# Patient Record
Sex: FEMALE | Race: WHITE
[De-identification: names, ages, dates, MRNs, and addresses within clinical notes are randomized per-mention and may not be internally consistent; named-entity substitution may affect disease eponyms.]

---

## 2017-01-19 ENCOUNTER — HOSPITAL ENCOUNTER (OUTPATIENT)
Dept: HOSPITAL 58 - LAB | Age: 37
Discharge: HOME | End: 2017-01-19
Attending: NURSE PRACTITIONER

## 2017-01-19 VITALS — BODY MASS INDEX: 28.8 KG/M2

## 2017-01-19 DIAGNOSIS — B18.2: Primary | ICD-10-CM

## 2017-01-19 LAB
ALBUMIN SERPL-MCNC: 3.8 G/DL (ref 3.4–5)
ALBUMIN/GLOB SERPL: 1.19 {RATIO}
ALP SERPL-CCNC: 76 U/L (ref 42–98)
ALT SERPL-CCNC: 15 U/L (ref 12–78)
ANION GAP SERPL CALC-SCNC: 14.1 MMOL/L
AST SERPL-CCNC: 16 U/L (ref 15–37)
BASOPHILS # BLD AUTO: 0.1 K/UL (ref 0–0.2)
BASOPHILS NFR BLD AUTO: 0.7 % (ref 0–3)
BILIRUB SERPL-MCNC: 0.3 MG/DL (ref 0–1.2)
BUN SERPL-MCNC: 10 MG/DL (ref 7–18)
BUN/CREAT SERPL: 11.23
CALCIUM SERPL-MCNC: 9.4 MG/DL (ref 8.2–10.2)
CHLORIDE SERPL-SCNC: 104 MMOL/L (ref 98–107)
CHOLEST SERPL-MCNC: 307 MG/DL (ref 0–200)
CHOLEST/HDLC SERPL: 7.9 {RATIO} (ref 4.5–5.5)
CO2 BLD-SCNC: 26 MMOL/L (ref 21–32)
CREAT SERPL-MCNC: 0.89 MG/DL (ref 0.6–1.3)
EOSINOPHIL # BLD AUTO: 0.3 K/UL (ref 0–0.7)
EOSINOPHIL NFR BLD AUTO: 3.5 % (ref 0–7)
GFR SERPLBLD BASED ON 1.73 SQ M-ARVRAT: 71 ML/MIN
GLOBULIN SER CALC-MCNC: 3.2 G/L
GLUCOSE SERPL-MCNC: 92 MG/DL (ref 70–110)
HCT VFR BLD AUTO: 40.9 % (ref 37–47)
HDLC SERPL-MCNC: 39 MG/DL (ref 35–80)
HGB BLD-MCNC: 13.6 G/DL (ref 12–16)
IMM GRANULOCYTES NFR BLD AUTO: 0.1 % (ref 0–5)
LYMPHOCYTES # SPEC AUTO: 2.6 K/UL (ref 0.6–3.4)
LYMPHOCYTES NFR BLD AUTO: 37.3 % (ref 10–50)
MCH RBC QN: 32.7 PG (ref 27–31)
MCHC RBC AUTO-ENTMCNC: 33.3 G/DL (ref 31.8–35.4)
MCV RBC: 98.3 FL (ref 81–99)
MONOCYTES # BLD AUTO: 0.5 K/UL (ref 0.4–2)
MONOCYTES NFR BLD AUTO: 7 % (ref 0–10)
NEUTROPHILS # BLD AUTO: 3.6 K/UL (ref 2–6.9)
NEUTROPHILS NFR BLD AUTO: 51.4 %
PLATELET # BLD AUTO: 290 10^3/UL (ref 140–440)
POTASSIUM SERPL-SCNC: 4.1 MMOL/L (ref 3.5–5.1)
PROT SERPL-MCNC: 7 G/DL (ref 6.4–8.2)
RBC # BLD AUTO: 4.16 10^6/UL (ref 4.2–5.4)
SODIUM SERPL-SCNC: 140 MMOL/L (ref 136–145)
TRIGL SERPL-MCNC: 116 MG/DL (ref 30–150)
VLDLC SERPL CALC-MCNC: 23 MG/DL (ref 2–30)
WBC # BLD AUTO: 7.05 K/UL (ref 4.6–10.2)

## 2017-01-19 PROCEDURE — 85025 COMPLETE CBC W/AUTO DIFF WBC: CPT

## 2017-01-19 PROCEDURE — 80061 LIPID PANEL: CPT

## 2017-01-19 PROCEDURE — 36415 COLL VENOUS BLD VENIPUNCTURE: CPT

## 2017-01-19 PROCEDURE — 80053 COMPREHEN METABOLIC PANEL: CPT

## 2017-04-13 ENCOUNTER — HOSPITAL ENCOUNTER (OUTPATIENT)
Dept: HOSPITAL 58 - RAD | Age: 37
Discharge: HOME | End: 2017-04-13
Attending: NURSE PRACTITIONER

## 2017-04-13 VITALS — BODY MASS INDEX: 28.8 KG/M2

## 2017-04-13 DIAGNOSIS — E65: Primary | ICD-10-CM

## 2017-04-13 PROCEDURE — 76882 US LMTD JT/FCL EVL NVASC XTR: CPT

## 2017-04-13 NOTE — US
EXAM:  Ultrasound exam 

  

HISTORY:  Localized adiposity 

  

COMPARISON:  None 

  

FINDINGS:  Ultrasound right axilla was performed in the region of clinical concern.  No mass  is leonid
ntified.  No focal sonographic abnormality identified. 

  

IMPRESSION:  No mass identified in the right axilla in the region of clinical concern.

## 2017-05-27 ENCOUNTER — HOSPITAL ENCOUNTER (EMERGENCY)
Dept: HOSPITAL 58 - ED | Age: 37
Discharge: HOME | End: 2017-05-27

## 2017-05-27 VITALS — SYSTOLIC BLOOD PRESSURE: 116 MMHG | DIASTOLIC BLOOD PRESSURE: 81 MMHG | TEMPERATURE: 98 F

## 2017-05-27 VITALS — BODY MASS INDEX: 28.1 KG/M2

## 2017-05-27 DIAGNOSIS — Z76.0: ICD-10-CM

## 2017-05-27 DIAGNOSIS — Z00.8: Primary | ICD-10-CM

## 2017-05-27 DIAGNOSIS — Z79.899: ICD-10-CM

## 2017-05-27 PROCEDURE — 99282 EMERGENCY DEPT VISIT SF MDM: CPT

## 2017-05-27 NOTE — ED.PDOC
Medical Screening Exam





- General Information


Time Seen by Physician*: 14:58


Mode of Arrival: Walk-In


Information Source: Patient





- History


Chief Complaint: Medication Refill


Stated Complaint: Patient medications are due at [facility], it is 

closed today needs medications for 3 days.  not suicidal or hocidal.


Severity: None





- Review Of Systems


Constitutional: None


CV: Reports: None


Respiratory: Reports: None


GI: Reports: None


: Reports: None


Musculoskeletal: Reports: None


Neuro: Reports: None





- Past Medical History


Past Medical History: Previously healthy (depression)





- Examination Findings


Visit Related to Pregnancy: No





- Medical Decision Making


Emergency Medical Condition: No





Physical Exam





- Physical Exam


Appearance: Well-appearing, No pain distress, Well-nourished


Eyes: JD, EOMI, Conjunctiva clear


ENT: Ears normal, Nose normal, Oropharynx normal


Respiratory: Airway patent, Breath sounds clear, Breath sounds equal, 

Respirations nonlabored


Cardiovascular: RRR, Pulses normal, No rub, No murmur


GI/: Soft, Nontender, No masses, Bowel sounds normal, No Organomegaly


Musculoskeletal: Normal strength, ROM intact, No edema, No calf tenderness


Skin: Warm, Dry, Normal color


Neurological: Sensation intact, Motor intact, Reflexes intact, Cranial nerves 

intact, Alert, Oriented


Psychiatric: Affect appropriate, Mood appropriate





Critical Care Note





- Critical Care Note


Total Time (mins): 0





Course





- Course


Vital Signs: 





 











  Temp Pulse Resp BP Pulse Ox


 


 05/27/17 14:42  98 F  96 H  20  116/81  95














Departure





- Departure


Time of Disposition: 15:03


Disposition: HOME SELF-CARE


Discharge Problem: 


 Medication refill





Instructions:  Medicine Refill (ED)


Condition: Stable


Pt referred to PMD for follow-up: No


Additional Instructions: 


patient is stable


all she needs is refills





Prescriptions: 


Escitalopram Oxalate [Lexapro] 20 mg PO DAILY #4 tablet


Quetiapine Fumarate [Seroquel] 200 mg PO DAILY #4 tablet


Allergies/Adverse Reactions: 


Allergies





No Known Allergies Allergy (Verified 11/21/16 13:45)


 








Home Medications: 


Ambulatory Orders





Diazepam [Valium] 10 mg PO TID 08/06/13 


Quetiapine Fumarate [Seroquel] 200 mg PO BEDTIME 08/06/13 


Escitalopram Oxalate [Lexapro] 20 mg PO DAILY 04/12/17 


Escitalopram Oxalate [Lexapro] 20 mg PO DAILY #4 tablet 05/27/17 


Quetiapine Fumarate [Seroquel] 200 mg PO DAILY #4 tablet 05/27/17 








Disposition Discussed With: Patient

## 2017-08-01 ENCOUNTER — HOSPITAL ENCOUNTER (EMERGENCY)
Dept: HOSPITAL 58 - ED | Age: 37
Discharge: HOME | End: 2017-08-01

## 2017-08-01 VITALS — DIASTOLIC BLOOD PRESSURE: 77 MMHG | SYSTOLIC BLOOD PRESSURE: 112 MMHG | TEMPERATURE: 98.8 F

## 2017-08-01 VITALS — BODY MASS INDEX: 27.3 KG/M2

## 2017-08-01 DIAGNOSIS — R10.9: Primary | ICD-10-CM

## 2017-08-01 DIAGNOSIS — F17.210: ICD-10-CM

## 2017-08-01 LAB
ADD URINE MICROSCOPIC: YES
ALBUMIN SERPL-MCNC: 3.6 G/DL (ref 3.4–5)
ALBUMIN/GLOB SERPL: 1.2 {RATIO}
ALP SERPL-CCNC: 73 U/L (ref 42–98)
ALT SERPL-CCNC: 18 U/L (ref 12–78)
AMYLASE SERPL-CCNC: 69 U/L (ref 25–115)
ANION GAP SERPL CALC-SCNC: 11.8 MMOL/L
AST SERPL-CCNC: 16 U/L (ref 15–37)
BASOPHILS # BLD AUTO: 0 K/UL (ref 0–0.2)
BASOPHILS NFR BLD AUTO: 0.6 % (ref 0–3)
BILIRUB SERPL-MCNC: 0.26 MG/DL (ref 0–1.2)
BUN SERPL-MCNC: 7 MG/DL (ref 7–18)
BUN/CREAT SERPL: 6.79
CALCIUM SERPL-MCNC: 8.7 MG/DL (ref 8.2–10.2)
CHLORIDE SERPL-SCNC: 105 MMOL/L (ref 98–107)
CO2 BLD-SCNC: 28 MMOL/L (ref 21–32)
COCAINE UR QL SCN: NEGATIVE
CREAT SERPL-MCNC: 1.03 MG/DL (ref 0.6–1.3)
EOSINOPHIL # BLD AUTO: 0.4 K/UL (ref 0–0.7)
EOSINOPHIL NFR BLD AUTO: 5.3 % (ref 0–7)
GFR SERPLBLD BASED ON 1.73 SQ M-ARVRAT: 60 ML/MIN
GLOBULIN SER CALC-MCNC: 3 G/L
GLUCOSE SERPL-MCNC: 81 MG/DL (ref 70–110)
HCT VFR BLD AUTO: 37.1 % (ref 37–47)
HGB BLD-MCNC: 12.8 G/DL (ref 12–16)
IMM GRANULOCYTES NFR BLD AUTO: 0.3 % (ref 0–5)
LIPASE SERPL-CCNC: 51 U/L (ref 8–78)
LYMPHOCYTES # SPEC AUTO: 3.3 K/UL (ref 0.6–3.4)
LYMPHOCYTES NFR BLD AUTO: 48.1 % (ref 10–50)
MCH RBC QN: 34.1 PG (ref 27–31)
MCHC RBC AUTO-ENTMCNC: 34.5 G/DL (ref 31.8–35.4)
MCV RBC: 98.9 FL (ref 81–99)
MONOCYTES # BLD AUTO: 0.6 K/UL (ref 0.4–2)
MONOCYTES NFR BLD AUTO: 8.7 % (ref 0–10)
NEUTROPHILS # BLD AUTO: 2.5 K/UL (ref 2–6.9)
NEUTROPHILS NFR BLD AUTO: 37 %
PH UR: 6.5 [PH] (ref 5–9)
PLATELET # BLD AUTO: 258 10^3/UL (ref 140–440)
POTASSIUM SERPL-SCNC: 3.8 MMOL/L (ref 3.5–5.1)
PROT SERPL-MCNC: 6.6 G/DL (ref 6.4–8.2)
RBC # BLD AUTO: 3.75 10^6/UL (ref 4.2–5.4)
SODIUM SERPL-SCNC: 141 MMOL/L (ref 136–145)
SP GR UR: <=1.005 (ref 1–1.03)
WBC # BLD AUTO: 6.8 K/UL (ref 4.6–10.2)

## 2017-08-01 PROCEDURE — 80306 DRUG TEST PRSMV INSTRMNT: CPT

## 2017-08-01 PROCEDURE — 81001 URINALYSIS AUTO W/SCOPE: CPT

## 2017-08-01 PROCEDURE — 80053 COMPREHEN METABOLIC PANEL: CPT

## 2017-08-01 PROCEDURE — 83690 ASSAY OF LIPASE: CPT

## 2017-08-01 PROCEDURE — 85025 COMPLETE CBC W/AUTO DIFF WBC: CPT

## 2017-08-01 PROCEDURE — 36415 COLL VENOUS BLD VENIPUNCTURE: CPT

## 2017-08-01 PROCEDURE — 82150 ASSAY OF AMYLASE: CPT

## 2017-08-01 PROCEDURE — 99283 EMERGENCY DEPT VISIT LOW MDM: CPT

## 2017-08-01 NOTE — ED.PDOC
General


ED Provider: 


Dr. CHRISTIE CHAPMAN





Chief Complaint: Abdominal Pain


Stated Complaint: ABDOMINAL PAIN


Time Seen by Physician: 17:30 (SEEN WITH STAFF )


Mode of Arrival: Walk-In


Information Source: Patient


Exam Limitations: No limitations


[facility] 


[facility]





Nursing and Triage Documentation Reviewed and Agree: Yes





GI Complaint Exam





- Abdominal Pain Complaint/Exam


Onset: Gradual


Symptoms Are: Still present


Timing: Constant


Initial Severity: Moderate


Current Severity: Moderate


Location of Pain: Discrete


Radiates To: Reports: Chest


Character: Reports: Dull, Aching


Aggravating: Reports: None


Alleviating: Reports: None


Associated Signs and Symptoms: Denies: Diaphoresis, Fever, Cough, Chest pain, 

Dizziness, Back pain, Constipation, Blood in stool, Dysuria, Urinary frequency, 

Decreased urine output, Decreased appetite, Vaginal bleeding, Vaginal discharge

, Nausea, Vomiting, Diarrhea, Sore throat, Decreased activity


Related History: Reports: Similar episode


AAA Risk Factors: Reports: None


Cardiac Risk Factors: Reports: None


Ectopic Pregnancy Risk Factors: Reports: None


Ovarian Torsion Risk Factors: Reports: None


Surgical Obstruction Risk Factors: Reports: None


Related Surgical History: Reports: None


Patient Rh Status: Unknown


Abdominal Findings: Present: None





Review of Systems





- Review Of Systems


Constitutional: Reports: No symptoms


Eyes: Reports: No symptoms


Ears, Nose, Mouth, Throat: Reports: No symptoms


Respiratory: Reports: No symptoms


Cardiac: Reports: No symptoms


GI: Reports: Abdominal pain


: Reports: No symptoms


Musculoskeletal: Reports: No symptoms


Skin: Reports: No symptoms


Neurological: Reports: No symptoms


Endocrine: Reports: No symptoms


Hematologic/Lymphatic: Reports: No symptoms


All Other Systems: Reviewed and Negative





Past Medical History





- Past Medical History


Previously Healthy: Yes


Endocrine: Reports: None


Cardiovascular: Reports: None


Respiratory: Reports: None


Hematological: Reports: None


Gastrointestinal: Reports: None


Genitourinary: Reports: None


Neuro/Psych: Reports: Anxiety, Depression


Musculoskeletal: Reports: None


Cancer: Reports: None


Last Menstrual Period: na





- Surgical History


General Surgical History: Reports: None





- Family History


Family History: Reports: None





- Social History


Smoking Status: Current every day smoker


Hx Substance Use: Yes (medical [facility] card)


Alcohol Screening: None





- Immunizations


Tetanus Shot up to Date: Yes





Physical Exam





- Physical Exam


Appearance: Well-appearing, No pain distress, Well-nourished


Eyes: JD, EOMI, Conjunctiva clear


ENT: Ears normal, Nose normal, Oropharynx normal


Respiratory: Airway patent, Breath sounds clear, Breath sounds equal, 

Respirations nonlabored


Cardiovascular: RRR, Pulses normal, No rub, No murmur


GI/: Soft, Nontender, No masses, Bowel sounds normal, No Organomegaly


Musculoskeletal: Normal strength, ROM intact, No edema, No calf tenderness


Skin: Warm, Dry, Normal color


Neurological: Sensation intact, Motor intact, Reflexes intact, Cranial nerves 

intact, Alert, Oriented


Psychiatric: Affect appropriate, Mood appropriate





Critical Care Note





- Critical Care Note


Total Time (mins): 0





Course





- Course


Hematology/Chemistry: 


 08/01/17 18:00





Orders, Labs, Meds: 





Lab Review











  08/01/17 08/01/17





  17:40 18:00


 


WBC   6.80


 


RBC   3.75 L


 


Hgb   12.8


 


Hct   37.1


 


MCV   98.9


 


MCH   34.1 H


 


MCHC   34.5


 


RDW Coeff of Pop   14.7


 


Plt Count   258


 


Immature Gran % (Auto)   0.3


 


Neut % (Auto)   37.0


 


Lymph % (Auto)   48.1


 


Mono % (Auto)   8.7


 


Eos % (Auto)   5.3


 


Baso % (Auto)   0.6


 


Immature Gran # (Auto)   0.0


 


Neut #   2.5


 


Lymph #   3.3


 


Mono #   0.6


 


Eos #   0.4


 


Baso #   0.0


 


Urine Color  Yellow 


 


Urine Clarity  Clear 


 


Urine pH  6.5 


 


Ur Specific Gravity  <=1.005 


 


Urine Protein  Negative 


 


Urine Glucose (UA)  Negative 


 


Urine Ketones  Negative 


 


Urine Blood  Trace-intact 


 


Urine Nitrite  Negative 


 


Urine Bilirubin  Negative 


 


Urine Urobilinogen  0.2 


 


Ur Leukocyte Esterase  Negative 


 


Urine Microscopic RBC  0-2 


 


Ur Squamous Epith Cells  0-2 








Orders











 Category Date Time Status


 


 AMYLASE Stat LAB  08/01/17 18:00 Received


 


 CBC W/ AUTO DIFF Stat LAB  08/01/17 18:00 Completed


 


 COMPREHENSIVE METABOLIC PANEL Stat LAB  08/01/17 18:00 Received


 


 LIPASE Stat LAB  08/01/17 18:00 Received


 


 URINALYSIS C & S IF INDICATED Stat LAB  08/01/17 17:40 Completed


 


 URINE DRUG SCREEN (RAPID FOR ED) [DRUG SCREEN, URINE, LAB  08/01/17 17:40 

Received





 RAPID] Stat   


 


 CT ABDOMEN/PELVIS WO CONTRAST Stat RADS  08/01/17 17:55 Taken











Vital Signs: 





 











  Temp Pulse Resp BP Pulse Ox


 


 08/01/17 17:27  98.8 F  79  20  112/77  96














Departure





- Departure


Time of Disposition: 19:00


Disposition: HOME SELF-CARE


Discharge Problem: 


 Abdominal pain





Instructions:  Abdominal Pain (ED)


Condition: Good


Pt referred to PMD for follow-up: Yes


Allergies/Adverse Reactions: 


Allergies





No Known Allergies Allergy (Verified 08/01/17 17:36)


 








Home Medications: 


Ambulatory Orders





Diazepam [Valium] 10 mg PO TID 08/06/13 


Quetiapine Fumarate [Seroquel] 200 mg PO BEDTIME 08/06/13 


Escitalopram Oxalate [Lexapro] 20 mg PO DAILY 04/12/17 


Escitalopram Oxalate [Lexapro] 20 mg PO DAILY #4 tablet 05/27/17 


Quetiapine Fumarate [Seroquel] 200 mg PO DAILY #4 tablet 05/27/17 








Disposition Discussed With: Patient

## 2017-08-01 NOTE — CT
EXAM:  CT scan of the abdomen and pelvis without contrast 

  

HISTORY:  Pain 

  

TECHNIQUE:  Imaging of the abdomen and pelvis was performed without contrast.  3 mm thin axial image
s and coronal and sagittal reconstructions were provided for interpretation. 

  

Comparison 04/10/2017. 

  

FINDINGS:  The liver, spleen, pancreas, adrenal glands and kidneys appear normal.  The proximal uret
ers are normal size.  The small and large bowel loops are normal caliber.  There is no free air.  No
 acute abnormalities are seen within the anterior abdominal wall.  No retroperitoneal abnormalities 
are seen. 

  

The helical images obtained through the pelvis demonstrate a normal appearance of the rectum, urinar
y bladder.  The appendix appears normal.  There is no free fluid seen within the pelvis.  Patchy opa
cities are seen within the dependent lung bases bilaterally.  No lytic or blastic lesions are seen w
ithin the osseous structures. 

  

IMPRESSION:  There is no bowel obstruction or acute inflammatory change seen within the abdomen and 
pelvis. 

  

There is no ureteral obstruction. 

  

Mild atelectasis versus pneumonia seen within the dependent lung bases bilaterally.

## 2018-02-27 ENCOUNTER — HOSPITAL ENCOUNTER (EMERGENCY)
Dept: HOSPITAL 58 - ED | Age: 38
Discharge: HOME | End: 2018-02-27

## 2018-02-27 VITALS — BODY MASS INDEX: 31.2 KG/M2

## 2018-02-27 VITALS — DIASTOLIC BLOOD PRESSURE: 78 MMHG | TEMPERATURE: 98.2 F | SYSTOLIC BLOOD PRESSURE: 113 MMHG

## 2018-02-27 DIAGNOSIS — K27.9: Primary | ICD-10-CM

## 2018-02-27 PROCEDURE — 81001 URINALYSIS AUTO W/SCOPE: CPT

## 2018-02-27 PROCEDURE — 93010 ELECTROCARDIOGRAM REPORT: CPT

## 2018-02-27 PROCEDURE — 85025 COMPLETE CBC W/AUTO DIFF WBC: CPT

## 2018-02-27 PROCEDURE — 93005 ELECTROCARDIOGRAM TRACING: CPT

## 2018-02-27 PROCEDURE — 83690 ASSAY OF LIPASE: CPT

## 2018-02-27 PROCEDURE — 99283 EMERGENCY DEPT VISIT LOW MDM: CPT

## 2018-02-27 PROCEDURE — 36415 COLL VENOUS BLD VENIPUNCTURE: CPT

## 2018-02-27 PROCEDURE — 80053 COMPREHEN METABOLIC PANEL: CPT

## 2018-02-27 NOTE — DI
EXAM:  Single-view chest 

  

HISTORY:  Chest pain 

  

COMPARISON:  Two-view chest 08/30/2016 

  

FINDINGS:  The cardiomediastinal is normal.  The lungs are clear bilaterally. 

  

IMPRESSION: 

  

No evidence of active pulmonary disease

## 2018-02-27 NOTE — ED.PDOC
General


Stated Complaint: Abdominal pain; 1 year worse yesterday and today.  Pain goes 

up to right chest.


Time Seen by Physician: 18:20


Mode of Arrival: Walk-In


Information Source: Patient


Exam Limitations: No limitations


Nursing and Triage Documentation Reviewed and Agree: Yes


Reviewed sepsis parameters & appropriate labs ordered?: Yes


System Inflammatory Response Syndrome: Not Applicable


System Inflammatory Response Syndrome: Not Applicable





<ÁLVARO HARRIS - Last Filed: 02/27/18 19:40>





<ARON LEIGH - Last Filed: 02/27/18 20:58>


ED Provider: 


Dr. ARON LEIGH





Chief Complaint: Abdominal Pain


Primary Care Provider: 


ARON LEIGH[facility]





Sepsis Protocol: 


For patient's 13 years and over:





Temp is 96.8 and below  and greater


Pulse >90 BPM


Resp >20/minute


Acutely Altered Mental Status





Are patient's symptoms suggestive of a new infection, such as:


   -Pneumonia


   -Skin, Soft Tissue


   -Endocarditis


   -UTI


   -Bone, Joint Infection


   -Implantable Device


   -Acute Abdominal Infection


   -Wound Infection


   -Meningitis


   -Blood Stream Catheter Infection


   -Unknown








GI Complaint Exam





- Abdominal Pain Complaint/Exam


Duration: One and a half years; worse last two days; scared she might need 

surgery


Symptoms Are: Still present


Timing: Intermittent


Initial Severity: Mild


Current Severity: Moderate


Location of Pain: Epigastric


Radiates To: Reports: Chest


Character: Reports: Sharp, Cramping


Aggravating: Reports: Movement


Alleviating: Reports: None





<ÁLVARO HARRIS - Last Filed: 02/27/18 19:40>





Review of Systems





- Review Of Systems


Constitutional: Reports: No symptoms, Malaise


Respiratory: Reports: No symptoms


Musculoskeletal: Reports: No symptoms


All Other Systems: Reviewed and Negative





<ÁLVARO HARRIS - Last Filed: 02/27/18 19:40>





Past Medical History





- Past Medical History


Previously Healthy: Yes


Endocrine: Reports: None


Cardiovascular: Reports: None


Respiratory: Reports: None


Hematological: Reports: None


Gastrointestinal: Reports: None


Genitourinary: Reports: None


Neuro/Psych: Reports: Anxiety, Depression


Musculoskeletal: Reports: None


Cancer: Reports: None


Last Menstrual Period: na





- Surgical History


General Surgical History: Reports: None





- Family History


Family History: Reports: None





- Social History


Smoking Status: Current some day smoker


Hx Substance Use: Yes (has [facility] card)


Alcohol Screening: None





- Immunizations


Tetanus Shot up to Date: Yes





<ÁLVARO HARRIS - Last Filed: 02/27/18 19:40>





Physical Exam





- Physical Exam


Appearance: Ill-appearing


Ill-appearing: Mild


Pain Distress: Moderate


Eyes: JD, EOMI


Neck: Supple


Respiratory: Airway patent, Breath sounds clear, Breath sounds equal


Cardiovascular: RRR, Pulses normal


GI/: Soft, Tender (mild diffuse tenderness; no rebound)


Skin: Warm, Dry, Normal color


Neurological: Sensation intact, Motor intact


Psychiatric: Affect appropriate, Mood appropriate, Anxious (very - says scared 

she might need surgery)





<ÁLVARO HARRIS - Last Filed: 02/27/18 19:40>





Interpretation





- EKG Interpretation


Rate: Normal


Rhythm: Sinus


Ectopy: None


ST Segment: Normal





<ÁLVARO HARRIS - Last Filed: 02/27/18 19:40>





- Radiology Interpretation


Radiology Interpretation By: Radiologist


Radiology Results: Negative


Exam Interpreted: CT Scan





<ARON LEIGH - Last Filed: 02/27/18 20:58>





Critical Care Note





- Critical Care Note


Total Time (mins): 25





<ÁLVARO HARRIS - Last Filed: 02/27/18 19:40>





Course





- Course


Hematology/Chemistry: 


 02/27/18 18:19





 02/27/18 18:19





<ÁLVARO HARRIS - Last Filed: 02/27/18 19:40>





- Course


Hematology/Chemistry: 


 02/27/18 18:19





 02/27/18 18:19





<ARON LEIGH - Last Filed: 02/27/18 20:58>





- Course


Orders, Labs, Meds: 





Lab Review











  02/27/18 02/27/18 02/27/18





  18:19 18:19 18:19


 


WBC  7.49  


 


RBC  4.16 L  


 


Hgb  13.6  


 


Hct  39.5  


 


MCV  95.0  


 


MCH  32.7 H  


 


MCHC  34.4  


 


RDW Coeff of Pop  14.9 H  


 


Plt Count  275  


 


Immature Gran % (Auto)  0.3  


 


Neut % (Auto)  49.4  


 


Lymph % (Auto)  37.8  


 


Mono % (Auto)  7.7  


 


Eos % (Auto)  4.3  


 


Baso % (Auto)  0.5  


 


Immature Gran # (Auto)  0.0  


 


Neut # (Auto)  3.7  


 


Lymph # (Auto)  2.8  


 


Mono # (Auto)  0.6  


 


Eos # (Auto)  0.3  


 


Baso # (Auto)  0.0  


 


Sodium   142 


 


Potassium   3.3 L 


 


Chloride   105 


 


Carbon Dioxide   27 


 


Anion Gap   13.3 


 


BUN   7 


 


Creatinine   1.02 


 


Estimated GFR (MDRD)   60.00 


 


BUN/Creatinine Ratio   6.86 


 


Glucose   94 


 


Calcium   9.0 


 


Total Bilirubin   0.4 


 


AST   23 


 


ALT   26 


 


Alkaline Phosphatase   78 


 


Total Protein   7.0 


 


Albumin   3.8 


 


Globulin   3.2 


 


Albumin/Globulin Ratio   1.19 


 


Lipase    15


 


Urine Color   


 


Urine Clarity   


 


Urine pH   


 


Ur Specific Gravity   


 


Urine Protein   


 


Urine Glucose (UA)   


 


Urine Ketones   


 


Urine Blood   


 


Urine Nitrite   


 


Urine Bilirubin   


 


Urine Urobilinogen   


 


Ur Leukocyte Esterase   


 


Urine Microscopic RBC   


 


Ur Squamous Epith Cells   


 


Urine Bacteria   














  02/27/18





  18:20


 


WBC 


 


RBC 


 


Hgb 


 


Hct 


 


MCV 


 


MCH 


 


MCHC 


 


RDW Coeff of Pop 


 


Plt Count 


 


Immature Gran % (Auto) 


 


Neut % (Auto) 


 


Lymph % (Auto) 


 


Mono % (Auto) 


 


Eos % (Auto) 


 


Baso % (Auto) 


 


Immature Gran # (Auto) 


 


Neut # (Auto) 


 


Lymph # (Auto) 


 


Mono # (Auto) 


 


Eos # (Auto) 


 


Baso # (Auto) 


 


Sodium 


 


Potassium 


 


Chloride 


 


Carbon Dioxide 


 


Anion Gap 


 


BUN 


 


Creatinine 


 


Estimated GFR (MDRD) 


 


BUN/Creatinine Ratio 


 


Glucose 


 


Calcium 


 


Total Bilirubin 


 


AST 


 


ALT 


 


Alkaline Phosphatase 


 


Total Protein 


 


Albumin 


 


Globulin 


 


Albumin/Globulin Ratio 


 


Lipase 


 


Urine Color  Yellow


 


Urine Clarity  Clear


 


Urine pH  6.0


 


Ur Specific Gravity  1.010


 


Urine Protein  Negative


 


Urine Glucose (UA)  Negative


 


Urine Ketones  Negative


 


Urine Blood  Trace-lysed


 


Urine Nitrite  Negative


 


Urine Bilirubin  Negative


 


Urine Urobilinogen  0.2


 


Ur Leukocyte Esterase  Negative


 


Urine Microscopic RBC  0-2


 


Ur Squamous Epith Cells  2-5


 


Urine Bacteria  Trace








Orders











 Category Date Time Status


 


 EKG-(ED ONLY) Stat CARDIO  02/27/18 18:39 Completed


 


 NPO REMINDER: IMAGING ONCE CARE  02/27/18 19:08 Completed


 


 CBC W/ AUTO DIFF Stat LAB  02/27/18 18:19 Completed


 


 COMPREHENSIVE METABOLIC PANEL Stat LAB  02/27/18 18:19 Completed


 


 LIPASE Stat LAB  02/27/18 18:19 Completed


 


 URINALYSIS C & S IF INDICATED Stat LAB  02/27/18 18:20 Completed


 


 CHEST, 1V AP ONLY Stat RADS  02/27/18 18:40 Completed


 


 CT ABDOMEN/PELVIS W CONTRAST Stat RADS  02/27/18 19:08 Completed











Vital Signs: 





 











  Temp Pulse Resp BP Pulse Ox


 


 02/27/18 17:40  98.2 F  110 H  22  113/78  97














Departure





<ÁLVARO HARRIS - Last Filed: 02/27/18 19:40>





- Departure


Time of Disposition: 20:57


Pt referred to PMD for follow-up: Yes


IPMP verified?: No


Disposition Discussed With: Patient





<ARON LEIGH - Last Filed: 02/27/18 20:58>





- Departure


Disposition: HOME SELF-CARE


Discharge Problem: 


 PUD (peptic ulcer disease)





Instructions:  Peptic Ulcer (ED)


Condition: Stable


Additional Instructions: 


No spicy food, no fired food.


F/u with [facility]





Prescriptions: 


Ranitidine HCl [Zantac] 150 mg PO BIDAC #20 tablet


Sucralfate Susp [Carafate] 1 gm PO ACHS #1 bottle


Allergies/Adverse Reactions: 


Allergies





No Known Allergies Allergy (Verified 02/27/18 17:53)


 








Home Medications: 


Ambulatory Orders





Diazepam [Valium] 10 mg PO TID 08/06/13 


Quetiapine Fumarate [Seroquel] 200 mg PO BEDTIME 08/06/13 


Escitalopram Oxalate [Lexapro] 20 mg PO DAILY #4 tablet 05/27/17 


Hydrocodone/Acetaminophen [Norco 5-325 Tablet] 1 each PO Q6HR PRN #7 tablet 08/ 01/17 


Ranitidine HCl [Zantac] 150 mg PO BIDAC #20 tablet 02/27/18 


Sucralfate Susp [Carafate] 1 gm PO ACHS #1 bottle 02/27/18

## 2018-02-27 NOTE — CT
EXAM:  CT scan abdomen pelvis with contrast 

  

HISTORY:  Right-sided pain 

  

COMPARISON:  CT scan abdomen pelvis 08/01/2017 

  

FINDINGS:  Contiguous axial images obtained from lung bases to the symphysis pubis following uneventf
ul administration intravenous contrast utilizing 3-mm collimation.  Sagittal and coronal reconstructi
ons were imaged and reviewed..  The visualized lung bases are clear.  The gallbladder is fluid filled
 without cholelithiasis.  The liver, pancreas, spleen and adrenal glands have normal enhanced CT appe
arance.  The kidneys excrete contrast normal fashion bilaterally .  There is a stable simple cyst lef
t kidney.  Right kidney is unremarkable.  Atherosclerotic changes are seen involving the aorta withou
t aneurysm formation.  There is a normal appendix.  There has been a prior hysterectomy.  There is no
 evidence of free fluid or inflammatory changes..  There is normal bladder..  Degenerative changes ar
e noted at L5-S1..  There is an umbilical hernia containing only fat 

  

IMPRESSION: 

  

No acute intra-abdominal findings. 

  

Prior hysterectomy.

## 2018-03-24 ENCOUNTER — HOSPITAL ENCOUNTER (EMERGENCY)
Dept: HOSPITAL 58 - ED | Age: 38
Discharge: HOME | End: 2018-03-24

## 2018-03-24 VITALS — SYSTOLIC BLOOD PRESSURE: 129 MMHG | DIASTOLIC BLOOD PRESSURE: 81 MMHG | TEMPERATURE: 97.4 F

## 2018-03-24 VITALS — BODY MASS INDEX: 31.6 KG/M2

## 2018-03-24 DIAGNOSIS — F17.210: ICD-10-CM

## 2018-03-24 DIAGNOSIS — K52.9: Primary | ICD-10-CM

## 2018-03-24 PROCEDURE — 96361 HYDRATE IV INFUSION ADD-ON: CPT

## 2018-03-24 PROCEDURE — 99283 EMERGENCY DEPT VISIT LOW MDM: CPT

## 2018-03-24 PROCEDURE — 36415 COLL VENOUS BLD VENIPUNCTURE: CPT

## 2018-03-24 PROCEDURE — 85025 COMPLETE CBC W/AUTO DIFF WBC: CPT

## 2018-03-24 PROCEDURE — 80053 COMPREHEN METABOLIC PANEL: CPT

## 2018-03-24 PROCEDURE — 96375 TX/PRO/DX INJ NEW DRUG ADDON: CPT

## 2018-03-24 PROCEDURE — 96365 THER/PROPH/DIAG IV INF INIT: CPT

## 2018-03-24 NOTE — ED.PDOC
General


ED Provider: 


Dr. VICTOR HUGO CHUN





Chief Complaint: Nausea/Vomiting


Stated Complaint: Sudden onset of nausea and vomiting this morning with 

associated Abdominal cramping and stomach pain. States she was getting a 

headache-was hurngry so cooked a corndog in microwave then consumed it . She 

became violently ill  with severe stomach cramping and nausea and vomiting


Time Seen by Physician: 16:20


Mode of Arrival: Walk-In


Information Source: Patient, Family


Exam Limitations: No limitations


[facility] 


[facility]





Nursing and Triage Documentation Reviewed and Agree: Yes


Reviewed sepsis parameters & appropriate labs ordered?: Yes


System Inflammatory Response Syndrome: Not Applicable


Sepsis Protocol: 


For patient's 13 years and over:





Temp is 96.8 and below  and greater


Pulse >90 BPM


Resp >20/minute


Acutely Altered Mental Status





Are patient's symptoms suggestive of a new infection, such as:


   -Pneumonia


   -Skin, Soft Tissue


   -Endocarditis


   -UTI


   -Bone, Joint Infection


   -Implantable Device


   -Acute Abdominal Infection


   -Wound Infection


   -Meningitis


   -Blood Stream Catheter Infection


   -Unknown





System Inflammatory Response Syndrome: Not Applicable





GI Complaint Exam





- Vomiting/Diarrhea Complaint/Exam


Symptoms Are: Still present


Episodes of Vomiting over last 24 Hours: 10


Episodes of Diarrhea Over Last 24 Hours: 0


Initial Severity: Severe


Current Severity: Severe


Character of Vomiting: Reports: Bilious


Character of Diarrhea: Denies: Bloody, Watery, Mucoid, Malodorous


Aggravating: Reports: Liquids, Movement


Alleviating: Reports: None


Associated Signs and Symptoms: Reports: Dizziness, Light-headedness, Abdominal 

pain, Cramping


Related History: Denies: Similar episode, Recent antibiotics


Recent Positive Pregnancy Test: No


Surgical Obstruction Risk Factors: Reports: Bilious emesis, Colicky abdominal 

pain


Related Surgical History: Reports: None


Abdominal Findings: Absent: Pulsatile mass, Abdominal distention, Rebound 

tenderness, Peritoneal signs, McBurney's Point tender, CVA Tenderness, Hernia, 

Inguinal swelling


Differential Diagnoses: Bowel Obstruction, Viral Gastroenteritis





Review of Systems





- Review Of Systems


Constitutional: Reports: No symptoms


Eyes: Reports: No symptoms


Ears, Nose, Mouth, Throat: Reports: No symptoms


Respiratory: Reports: No symptoms


Cardiac: Reports: No symptoms


GI: Reports: No symptoms, Abdominal pain, Nausea, Vomiting


: Reports: No symptoms


Musculoskeletal: Reports: No symptoms


Skin: Reports: No symptoms


Neurological: Reports: No symptoms


Endocrine: Reports: No symptoms


Hematologic/Lymphatic: Reports: No symptoms


All Other Systems: Reviewed and Negative





Past Medical History





- Past Medical History


Previously Healthy: Yes


Endocrine: Reports: None


Cardiovascular: Reports: None


Respiratory: Reports: None


Hematological: Reports: None


Gastrointestinal: Reports: None


Genitourinary: Reports: None


Neuro/Psych: Reports: Anxiety, Depression


Musculoskeletal: Reports: None


Cancer: Reports: None





- Surgical History


General Surgical History: Reports: None





- Family History


Family History: Reports: None





- Social History


Smoking Status: Current some day smoker


Hx Substance Use: Yes (has [address] card)


Alcohol Screening: None





Physical Exam





- Physical Exam


Appearance: Ill-appearing, Obese


Ill-appearing: Severe


Pain Distress: Moderate


Eyes: JD, EOMI, Conjunctiva clear


ENT: Ears normal, Nose normal, Oropharynx normal


Respiratory: Airway patent, Breath sounds clear, Breath sounds equal, 

Respirations nonlabored


Cardiovascular: RRR, Pulses normal, No rub, No murmur


GI/: Soft, Tender, Bowel sounds hypoactive


Musculoskeletal: Normal strength, ROM intact, No edema, No calf tenderness


Skin: Warm, Dry, Normal color


Neurological: Sensation intact, Motor intact, Reflexes intact, Cranial nerves 

intact, Alert, Oriented


Psychiatric: Affect appropriate, Mood appropriate





Critical Care Note





- Critical Care Note


Total Time (mins): 0





Course





- Course


Hematology/Chemistry: 


 03/24/18 16:25





 03/24/18 16:25


Orders, Labs, Meds: 


Lab Review











  03/24/18 03/24/18





  16:25 16:25


 


WBC  11.98 H 


 


RBC  4.30 


 


Hgb  13.9 


 


Hct  40.5 


 


MCV  94.2 


 


MCH  32.3 H 


 


MCHC  34.3 


 


RDW Coeff of Pop  14.7 


 


Plt Count  287 


 


Immature Gran % (Auto)  0.3 


 


Neut % (Auto)  56.0 


 


Lymph % (Auto)  32.8 


 


Mono % (Auto)  7.3 


 


Eos % (Auto)  3.2 


 


Baso % (Auto)  0.4 


 


Immature Gran # (Auto)  0.0 


 


Neut # (Auto)  6.7 


 


Lymph # (Auto)  3.9 H 


 


Mono # (Auto)  0.9 


 


Eos # (Auto)  0.4 


 


Baso # (Auto)  0.1 


 


Sodium   142


 


Potassium   3.2 L


 


Chloride   105


 


Carbon Dioxide   23


 


Anion Gap   17.2


 


BUN   9


 


Creatinine   1.04


 


Estimated GFR (MDRD)   59.00


 


BUN/Creatinine Ratio   8.65


 


Glucose   134 H


 


Calcium   9.3


 


Total Bilirubin   0.4


 


AST   23


 


ALT   26


 


Alkaline Phosphatase   82


 


Total Protein   7.5


 


Albumin   4.1


 


Globulin   3.4


 


Albumin/Globulin Ratio   1.21








Orders











 Category Date Time Status


 


 CBC W/ AUTO DIFF Stat LAB  03/24/18 16:25 Completed


 


 CMP [COMPREHENSIVE METABOLIC PANEL] Stat LAB  03/24/18 16:25 Completed


 


 0.9 % Sodium Chloride [Saline Flush] MEDS  03/24/18 16:10 Ordered





 1 syr IVF PRN PRN   


 


 Famotidine Inj [Pepcid] MEDS  03/24/18 16:15 Discontinued





 20 mg IVP ONCE STA   


 


 Pantoprazole Sodium [Protonix IV] MEDS  03/24/18 16:30 Ordered





 40 mg IVP DAILY   


 


 Promethazine HCl [Phenergan 25 mg/ml Vial] MEDS  03/24/18 16:17 Discontinued





 25 mg .ROUTE .STK-MED ONE   


 


 Promethazine HCl [Phenergan 25 mg/ml Vial] 25 mg MEDS  03/24/18 16:14 

Discontinued





 0.9 % Sodium Chloride [Sodium Chloride] 50 ml   





 IV ONCE   


 


 Sodium Chloride 0.9% [Sodium Chloride] 1,000 ml MEDS  03/24/18 16:13 

Discontinued





 IV BOLUS   








Medications











Generic Name Dose Route Start Last Admin





  Trade Name Freq  PRN Reason Stop Dose Admin


 


Pantoprazole Sodium  40 mg  03/24/18 16:30  03/24/18 16:26





  Protonix Iv  IVP   40 mg





  DAILY GABRIEL   Administration


 


Sodium Chloride  1 syr  03/24/18 16:10  





  Saline Flush  IVF   





  PRN PRN   





  To flush IV   














Discontinued Medications














Generic Name Dose Route Start Last Admin





  Trade Name Freq  PRN Reason Stop Dose Admin


 


Famotidine  20 mg  03/24/18 16:15  03/24/18 16:24





  Pepcid  IVP  03/24/18 16:16  20 mg





  ONCE STA   Administration


 


Sodium Chloride  1,000 mls @ 500 mls/hr  03/24/18 16:13  03/24/18 16:24





  Sodium Chloride  IV  03/24/18 18:12  500 mls/hr





  BOLUS STA   Administration


 


Promethazine HCl 25 mg/ Sodium  51 mls @ 75 mls/hr  03/24/18 16:14  03/24/18 16:

29





  Chloride  IV  03/24/18 16:54  75 mls/hr





  ONCE STA   Administration











Vital Signs: 


 











  Temp Pulse Resp BP Pulse Ox


 


 03/24/18 16:01  97.4 F L  84  20  129/81  98














Departure





- Departure


Time of Disposition: 19:40


Disposition: HOME SELF-CARE


Discharge Problem: 


 Gastroenteritis





Discharge Problem: 


 (Ruled Out): Acute alcoholic gastritis


Instructions:  Gastroenteritis (ED)


Condition: Good


Pt referred to PMD for follow-up: Yes (5-7 days)


IPMP verified?: No


Allergies/Adverse Reactions: 


Allergies





No Known Allergies Allergy (Verified 03/24/18 16:15)


 








Home Medications: 


Ambulatory Orders





Diazepam [Valium] 10 mg PO TID 08/06/13 


Quetiapine Fumarate [Seroquel] 200 mg PO BEDTIME 08/06/13 


Escitalopram Oxalate [Lexapro] 20 mg PO DAILY #4 tablet 05/27/17 


Hydrocodone/Acetaminophen [Norco 5-325 Tablet] 1 each PO Q6HR PRN #7 tablet 08/ 01/17 


Ranitidine HCl [Zantac] 150 mg PO BIDAC #20 tablet 02/27/18 


Sucralfate Susp [Carafate] 1 gm PO ACHS #1 bottle 02/27/18 


Famotidine [Pepcid] 20 mg PO BIDAC #20 tablet 03/24/18 


Ondansetron [Zofran Odt] 4 mg PO Q8H PRN #7 tab.rapdis 03/24/18 








Disposition Discussed With: Patient, Family

## 2018-05-30 ENCOUNTER — HOSPITAL ENCOUNTER (OUTPATIENT)
Dept: HOSPITAL 58 - RHC-LAB | Age: 38
Discharge: HOME | End: 2018-05-30
Attending: INTERNAL MEDICINE

## 2018-05-30 VITALS — BODY MASS INDEX: 31.6 KG/M2

## 2018-05-30 DIAGNOSIS — L02.419: Primary | ICD-10-CM

## 2018-05-30 PROCEDURE — 87070 CULTURE OTHR SPECIMN AEROBIC: CPT

## 2018-11-16 ENCOUNTER — HOSPITAL ENCOUNTER (EMERGENCY)
Dept: HOSPITAL 58 - ED | Age: 38
Discharge: HOME | End: 2018-11-16

## 2018-11-16 VITALS — TEMPERATURE: 97.5 F | DIASTOLIC BLOOD PRESSURE: 75 MMHG | SYSTOLIC BLOOD PRESSURE: 136 MMHG

## 2018-11-16 VITALS — BODY MASS INDEX: 36 KG/M2

## 2018-11-16 DIAGNOSIS — F17.210: ICD-10-CM

## 2018-11-16 DIAGNOSIS — Z76.0: Primary | ICD-10-CM

## 2018-11-16 PROCEDURE — 99282 EMERGENCY DEPT VISIT SF MDM: CPT

## 2018-11-16 NOTE — ED.PDOC
General


ED Provider: 


Dr. CHRISTIE CHAPMAN





Chief Complaint: Medication Refill


Stated Complaint: MED REFILL


Time Seen by Physician: 13:30 (SEEN WITH malcolm at all times )


Mode of Arrival: Walk-In


Information Source: Patient


Exam Limitations: No limitations


Primary Care Provider: 


CONSTANCE SNOW





Nursing and Triage Documentation Reviewed and Agree: Yes


Does patient meet sepsis criteria?: No


System Inflammatory Response Syndrome: Not Applicable


Sepsis Protocol: 


For patient's 13 years and over:





Temp is 96.8 and below  and greater


Pulse >90 BPM


Resp >20/minute


Acutely Altered Mental Status





Are patient's symptoms suggestive of a new infection, such as:


   -Pneumonia


   -Skin, Soft Tissue


   -Endocarditis


   -UTI


   -Bone, Joint Infection


   -Implantable Device


   -Acute Abdominal Infection


   -Wound Infection


   -Meningitis


   -Blood Stream Catheter Infection


   -Unknown








Miscellaneous Complaint Exam





- Complex/Multi-System Complaint/Exam


Onset/Duration: out of medication  offered no other reasons for visit


Associated Signs and Symptoms: Denies: Decreased responsiveness, Confusion, 

Agitation, Dizziness, Weakness, Syncope, Headache, Short of air, Cough, Wheezing

, Hemoptysis, Chest pain, Palpitations, Edema, Nausea, Vomiting, Diarrhea, 

Abdominal pain, Back pain, Dysuria, Hematemesis, Melena, Decreased oral intake, 

Fever, Diaphoresis, Immunocompromised, Anticoagulation Therapy, Recent 

medication changes, Indwelling medical device, Prior MRSA, Prior VRE, Recent 

trauma, Remote trauma


Focal Sensory Loss: Present: None


Gait: Normal


Gag Reflex Present: Yes


Differential Diagnosis: Other (med refill)





Review of Systems





- Review Of Systems


Constitutional: Reports: No symptoms


Eyes: Reports: No symptoms


Ears, Nose, Mouth, Throat: Reports: No symptoms


Respiratory: Reports: No symptoms


Cardiac: Reports: No symptoms


GI: Reports: No symptoms


: Reports: No symptoms


Musculoskeletal: Reports: No symptoms


Skin: Reports: No symptoms


Neurological: Reports: No symptoms


Endocrine: Reports: No symptoms


Hematologic/Lymphatic: Reports: No symptoms


All Other Systems: Reviewed and Negative





Past Medical History





- Past Medical History


Previously Healthy: Yes


Endocrine: Reports: None


Cardiovascular: Reports: None


Respiratory: Reports: None


Hematological: Reports: None


Gastrointestinal: Reports: None


Genitourinary: Reports: None


Neuro/Psych: Reports: Anxiety, Depression


Musculoskeletal: Reports: None


Cancer: Reports: None


Last Menstrual Period: N/A





- Surgical History


General Surgical History: Reports: None





- Family History


Family History: Reports: None





- Social History


Smoking Status: Current some day smoker


Hx Substance Use: Yes (has [place] card)


Alcohol Screening: None





- Immunizations


Tetanus Shot up to Date: No





Physical Exam





- Physical Exam


Appearance: Well-appearing, No pain distress, Well-nourished


Eyes: JD, EOMI, Conjunctiva clear


ENT: Ears normal, Nose normal, Oropharynx normal


Respiratory: Airway patent, Breath sounds clear, Breath sounds equal, 

Respirations nonlabored


Cardiovascular: RRR, Pulses normal, No rub, No murmur


GI/: Soft, Nontender, No masses, Bowel sounds normal, No Organomegaly


Musculoskeletal: Normal strength, ROM intact, No edema, No calf tenderness


Skin: Warm, Dry, Normal color


Neurological: Sensation intact, Motor intact, Reflexes intact, Cranial nerves 

intact, Alert, Oriented


Psychiatric: Affect appropriate, Mood appropriate





Critical Care Note





- Critical Care Note


Total Time (mins): 0





Course





- Course


Vital Signs: 





 











  Temp Pulse Resp BP Pulse Ox


 


 11/16/18 13:22  97.5 F L  80  18  136/75  95














Departure





- Departure


Time of Disposition: 14:20


Disposition: HOME SELF-CARE


Discharge Problem: 


 Medication refill





Instructions:  Medicine Refill (ED)


Condition: Good


Pt referred to PMD for follow-up: Yes


IPMP verified?: No


Additional Instructions: 


Please call your Family Physician as soon as possible to schedule a follow-up 

appointment.


Allergies/Adverse Reactions: 


Allergies





No Known Allergies Allergy (Verified 03/24/18 16:15)


 








Home Medications: 


Ambulatory Orders





Diazepam [Valium] 10 mg PO TID 08/06/13 


Quetiapine Fumarate [Seroquel] 200 mg PO BEDTIME 08/06/13 


Escitalopram Oxalate [Lexapro] 20 mg PO DAILY #4 tablet 05/27/17

## 2019-07-04 ENCOUNTER — HOSPITAL ENCOUNTER (EMERGENCY)
Dept: HOSPITAL 58 - ED | Age: 39
Discharge: HOME | End: 2019-07-04

## 2019-07-04 VITALS — BODY MASS INDEX: 33.2 KG/M2

## 2019-07-04 VITALS — DIASTOLIC BLOOD PRESSURE: 90 MMHG | SYSTOLIC BLOOD PRESSURE: 131 MMHG | TEMPERATURE: 100 F

## 2019-07-04 DIAGNOSIS — S40.811A: ICD-10-CM
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
## 2019-07-04 DIAGNOSIS — S40.812A: ICD-10-CM

## 2019-07-04 DIAGNOSIS — S41.151A: ICD-10-CM

## 2019-07-04 DIAGNOSIS — S41.152A: Primary | ICD-10-CM

## 2019-07-04 DIAGNOSIS — F17.210: ICD-10-CM

## 2019-07-04 PROCEDURE — 99283 EMERGENCY DEPT VISIT LOW MDM: CPT

## 2019-07-04 PROCEDURE — 90714 TD VACC NO PRESV 7 YRS+ IM: CPT

## 2019-07-04 PROCEDURE — 90471 IMMUNIZATION ADMIN: CPT

## 2019-07-04 NOTE — ED.PDOC
General


ED Provider: 


Dr. VICTOR HUGO SIEGEL-ER





Chief Complaint: Laceration


Stated Complaint: i was bitten and scratched by my cat


Time Seen by Physician: 21:10


Mode of Arrival: Walk-In


Information Source: Patient


Exam Limitations: No limitations


Primary Care Provider: 


CONSTANCE SNOW





Nursing and Triage Documentation Reviewed and Agree: Yes


Does patient meet sepsis criteria?: No


System Inflammatory Response Syndrome: Not Applicable


Sepsis Protocol: 


For patient's 13 years and over:





Temp is 96.8 and below  and greater


Pulse >90 BPM


Resp >20/minute


Acutely Altered Mental Status





Are patient's symptoms suggestive of a new infection, such as:


   -Pneumonia


   -Skin, Soft Tissue


   -Endocarditis


   -UTI


   -Bone, Joint Infection


   -Implantable Device


   -Acute Abdominal Infection


   -Wound Infection


   -Meningitis


   -Blood Stream Catheter Infection


   -Unknown








Skin Complaint Exam





- Lac/Torso/Upper Ext. Complaint/Exam


Location of Injury: Right, Left, Arm


Mechanism of Injury: Puncture, Abrasion


Symptoms Are: Still present


Initial Severity: Mild


Aggravating: Movement


Alleviating: Compression


Associated Signs and Symptoms: Denies: Fever, Chills, Erythema, Numbness, 

Tingling


Differential Diagnoses: Abrasion, Avulsion, Puncture Wound





Review of Systems





- Review Of Systems


Constitutional: Reports: No symptoms


Eyes: Reports: No symptoms


Ears, Nose, Mouth, Throat: Reports: No symptoms


Respiratory: Reports: No symptoms


Cardiac: Reports: No symptoms


GI: Reports: No symptoms


: Reports: No symptoms


Musculoskeletal: Reports: No symptoms


Skin: Reports: No symptoms


Neurological: Reports: No symptoms


Endocrine: Reports: No symptoms


Hematologic/Lymphatic: Reports: No symptoms


All Other Systems: Reviewed and Negative





Past Medical History





- Past Medical History


Previously Healthy: Yes


Endocrine: Reports: None


Cardiovascular: Reports: None


Respiratory: Reports: None


Hematological: Reports: None


Gastrointestinal: Reports: None


Genitourinary: Reports: None


Neuro/Psych: Reports: Anxiety, Depression


Musculoskeletal: Reports: None


Cancer: Reports: None


Last Menstrual Period: PT HAS HAD A HYSTERECTOMY





- Surgical History


General Surgical History: Reports: None





- Family History


Family History: Reports: None





- Social History


Smoking Status: Current every day smoker, Light tobacco smoker


Hx Substance Use: No (has [place] card)


Alcohol Screening: None





Physical Exam





- Physical Exam


Appearance: Well-appearing, No pain distress, Well-nourished


Eyes: JD, EOMI, Conjunctiva clear


ENT: Ears normal, Nose normal, Oropharynx normal


Neck: Supple


Respiratory: Airway patent, Breath sounds clear, Breath sounds equal, 

Respirations nonlabored


Cardiovascular: RRR, Pulses normal, No rub, No murmur


GI/: Soft, Nontender, No masses, Bowel sounds normal, No Organomegaly


Musculoskeletal: Normal strength, ROM intact, No edema, No calf tenderness


Skin: Warm


Neurological: Sensation intact, Motor intact, Reflexes intact, Cranial nerves 

intact, Alert, Oriented


Psychiatric: Affect appropriate





Critical Care Note





- Critical Care Note


Total Time (mins): 0





Course





- Course


Orders, Labs, Meds: 





Orders











 Category Date Time Status


 


 Wound care [ED WOUND CARE] .ONCE EMERGENCY  07/04/19 21:39 Active


 


 Amoxicillin/Potassium Clav [Augmentin 875-125 mg Tab] MEDS  07/04/19 21:38 Stat





 1 tab PO ONCE STA   


 


 Mupirocin [Bactroban Ointment 1 Gram Applicator (ER)] MEDS  07/04/19 21:39 Stat





 1 gm TP ONCE STA   


 


 Tetanus and Diphtheria Tox/Pf [Tenivac] MEDS  07/04/19 21:38 Once





 0.5 ml IM .ONCE ONE   











Vital Signs: 





 











  Temp Pulse Resp BP Pulse Ox


 


 07/04/19 21:08  100 F H  100 H  20  131/90  97














Departure





- Departure


Time of Disposition: 21:41


Disposition: HOME SELF-CARE


Discharge Problem: 


 Animal bite





Instructions:  Animal Bite (ED)


Condition: Good


Pt referred to PMD for follow-up: Yes


IPMP verified?: No


Additional Instructions: 


keep scratches clean with soap and water daily....change dressings daily till 

healed---return if any signs of infection


Allergies/Adverse Reactions: 


Allergies





No Known Allergies Allergy (Verified 07/04/19 21:16)


 








Home Medications: 


Ambulatory Orders





Diazepam [Valium] 10 mg PO BID 08/06/13 


Quetiapine Fumarate [Seroquel] 200 mg PO BEDTIME 08/06/13 


Escitalopram Oxalate [Lexapro] 20 mg PO DAILY #4 tablet 05/27/17 


Lamotrigine [Lamictal] 25 mg PO BID 07/04/19 








Disposition Discussed With: Patient

## 2019-08-28 ENCOUNTER — HOSPITAL ENCOUNTER (OUTPATIENT)
Dept: HOSPITAL 58 - LAB | Age: 39
Discharge: HOME | End: 2019-08-28
Attending: FAMILY MEDICINE

## 2019-08-28 VITALS — BODY MASS INDEX: 33.2 KG/M2

## 2019-08-28 DIAGNOSIS — B18.2: Primary | ICD-10-CM

## 2019-08-28 DIAGNOSIS — K76.0: ICD-10-CM

## 2019-08-28 PROCEDURE — 82977 ASSAY OF GGT: CPT

## 2019-08-28 PROCEDURE — 80307 DRUG TEST PRSMV CHEM ANLYZR: CPT

## 2019-08-28 PROCEDURE — 82150 ASSAY OF AMYLASE: CPT

## 2019-08-28 PROCEDURE — 83690 ASSAY OF LIPASE: CPT

## 2019-08-28 PROCEDURE — 82728 ASSAY OF FERRITIN: CPT

## 2019-08-28 PROCEDURE — 80076 HEPATIC FUNCTION PANEL: CPT

## 2019-08-28 PROCEDURE — 85610 PROTHROMBIN TIME: CPT

## 2019-08-28 PROCEDURE — 86709 HEPATITIS A IGM ANTIBODY: CPT

## 2019-08-28 PROCEDURE — 86038 ANTINUCLEAR ANTIBODIES: CPT

## 2019-08-28 PROCEDURE — 83516 IMMUNOASSAY NONANTIBODY: CPT

## 2019-08-28 PROCEDURE — 87522 HEPATITIS C REVRS TRNSCRPJ: CPT

## 2019-08-28 PROCEDURE — 82105 ALPHA-FETOPROTEIN SERUM: CPT

## 2019-08-28 PROCEDURE — 36415 COLL VENOUS BLD VENIPUNCTURE: CPT

## 2022-03-21 ENCOUNTER — APPOINTMENT (OUTPATIENT)
Dept: CT IMAGING | Age: 42
End: 2022-03-21
Payer: MEDICAID

## 2022-03-21 ENCOUNTER — HOSPITAL ENCOUNTER (EMERGENCY)
Age: 42
Discharge: HOME | End: 2022-03-21
Payer: MEDICAID

## 2022-03-21 VITALS
HEART RATE: 82 BPM | RESPIRATION RATE: 18 BRPM | DIASTOLIC BLOOD PRESSURE: 99 MMHG | SYSTOLIC BLOOD PRESSURE: 158 MMHG | OXYGEN SATURATION: 98 % | TEMPERATURE: 99 F

## 2022-03-21 DIAGNOSIS — K52.9 COLITIS: Primary | ICD-10-CM

## 2022-03-21 LAB
ALBUMIN SERPL-MCNC: 5.1 G/DL (ref 3.5–5.2)
ALP BLD-CCNC: 97 U/L (ref 35–104)
ALT SERPL-CCNC: 29 U/L (ref 5–33)
ANION GAP SERPL CALCULATED.3IONS-SCNC: 16 MMOL/L (ref 7–19)
AST SERPL-CCNC: 32 U/L (ref 5–32)
BACTERIA: NEGATIVE /HPF
BASOPHILS ABSOLUTE: 0.1 K/UL (ref 0–0.2)
BASOPHILS RELATIVE PERCENT: 0.8 % (ref 0–1)
BILIRUB SERPL-MCNC: 0.4 MG/DL (ref 0.2–1.2)
BILIRUBIN URINE: NEGATIVE
BLOOD, URINE: ABNORMAL
BUN BLDV-MCNC: 6 MG/DL (ref 6–20)
CALCIUM SERPL-MCNC: 9.4 MG/DL (ref 8.6–10)
CHLORIDE BLD-SCNC: 101 MMOL/L (ref 98–111)
CLARITY: CLEAR
CO2: 25 MMOL/L (ref 22–29)
COLOR: ABNORMAL
CREAT SERPL-MCNC: 0.8 MG/DL (ref 0.5–0.9)
CRYSTALS, UA: ABNORMAL /HPF
EOSINOPHILS ABSOLUTE: 0.1 K/UL (ref 0–0.6)
EOSINOPHILS RELATIVE PERCENT: 1.7 % (ref 0–5)
EPITHELIAL CELLS, UA: 6 /HPF (ref 0–5)
GFR AFRICAN AMERICAN: >59
GFR NON-AFRICAN AMERICAN: >60
GLUCOSE BLD-MCNC: 148 MG/DL (ref 74–109)
GLUCOSE URINE: NEGATIVE MG/DL
HCT VFR BLD CALC: 45.2 % (ref 37–47)
HEMOGLOBIN: 14.6 G/DL (ref 12–16)
HYALINE CASTS: 15 /HPF (ref 0–8)
IMMATURE GRANULOCYTES #: 0 K/UL
KETONES, URINE: ABNORMAL MG/DL
LEUKOCYTE ESTERASE, URINE: NEGATIVE
LIPASE: 21 U/L (ref 13–60)
LYMPHOCYTES ABSOLUTE: 2 K/UL (ref 1.1–4.5)
LYMPHOCYTES RELATIVE PERCENT: 24.5 % (ref 20–40)
MCH RBC QN AUTO: 30.7 PG (ref 27–31)
MCHC RBC AUTO-ENTMCNC: 32.3 G/DL (ref 33–37)
MCV RBC AUTO: 95.2 FL (ref 81–99)
MONOCYTES ABSOLUTE: 0.6 K/UL (ref 0–0.9)
MONOCYTES RELATIVE PERCENT: 6.7 % (ref 0–10)
NEUTROPHILS ABSOLUTE: 5.5 K/UL (ref 1.5–7.5)
NEUTROPHILS RELATIVE PERCENT: 66.1 % (ref 50–65)
NITRITE, URINE: NEGATIVE
PDW BLD-RTO: 15.6 % (ref 11.5–14.5)
PH UA: 6.5 (ref 5–8)
PLATELET # BLD: 390 K/UL (ref 130–400)
PMV BLD AUTO: 9.7 FL (ref 9.4–12.3)
POTASSIUM REFLEX MAGNESIUM: 4.1 MMOL/L (ref 3.5–5)
PROTEIN UA: 30 MG/DL
RBC # BLD: 4.75 M/UL (ref 4.2–5.4)
RBC UA: 8 /HPF (ref 0–4)
SODIUM BLD-SCNC: 142 MMOL/L (ref 136–145)
SPECIFIC GRAVITY UA: 1.02 (ref 1–1.03)
TOTAL PROTEIN: 7.4 G/DL (ref 6.6–8.7)
UROBILINOGEN, URINE: 1 E.U./DL
WBC # BLD: 8.3 K/UL (ref 4.8–10.8)
WBC UA: 5 /HPF (ref 0–5)

## 2022-03-21 PROCEDURE — 99283 EMERGENCY DEPT VISIT LOW MDM: CPT

## 2022-03-21 PROCEDURE — 6360000002 HC RX W HCPCS: Performed by: NURSE PRACTITIONER

## 2022-03-21 PROCEDURE — 36415 COLL VENOUS BLD VENIPUNCTURE: CPT

## 2022-03-21 PROCEDURE — 83690 ASSAY OF LIPASE: CPT

## 2022-03-21 PROCEDURE — 6360000004 HC RX CONTRAST MEDICATION: Performed by: NURSE PRACTITIONER

## 2022-03-21 PROCEDURE — 80053 COMPREHEN METABOLIC PANEL: CPT

## 2022-03-21 PROCEDURE — 96374 THER/PROPH/DIAG INJ IV PUSH: CPT

## 2022-03-21 PROCEDURE — 81001 URINALYSIS AUTO W/SCOPE: CPT

## 2022-03-21 PROCEDURE — 85025 COMPLETE CBC W/AUTO DIFF WBC: CPT

## 2022-03-21 PROCEDURE — 2580000003 HC RX 258: Performed by: NURSE PRACTITIONER

## 2022-03-21 PROCEDURE — 74177 CT ABD & PELVIS W/CONTRAST: CPT

## 2022-03-21 RX ORDER — ONDANSETRON 4 MG/1
4 TABLET, ORALLY DISINTEGRATING ORAL EVERY 8 HOURS PRN
Qty: 10 TABLET | Refills: 0 | Status: SHIPPED | OUTPATIENT
Start: 2022-03-21

## 2022-03-21 RX ORDER — ONDANSETRON 2 MG/ML
4 INJECTION INTRAMUSCULAR; INTRAVENOUS ONCE
Status: COMPLETED | OUTPATIENT
Start: 2022-03-21 | End: 2022-03-21

## 2022-03-21 RX ORDER — CIPROFLOXACIN 500 MG/1
500 TABLET, FILM COATED ORAL 2 TIMES DAILY
Qty: 20 TABLET | Refills: 0 | Status: SHIPPED | OUTPATIENT
Start: 2022-03-21 | End: 2022-03-31

## 2022-03-21 RX ORDER — SODIUM CHLORIDE, SODIUM LACTATE, POTASSIUM CHLORIDE, AND CALCIUM CHLORIDE .6; .31; .03; .02 G/100ML; G/100ML; G/100ML; G/100ML
1000 INJECTION, SOLUTION INTRAVENOUS ONCE
Status: COMPLETED | OUTPATIENT
Start: 2022-03-21 | End: 2022-03-21

## 2022-03-21 RX ORDER — METRONIDAZOLE 500 MG/1
500 TABLET ORAL 3 TIMES DAILY
Qty: 30 TABLET | Refills: 0 | Status: SHIPPED | OUTPATIENT
Start: 2022-03-21 | End: 2022-03-31

## 2022-03-21 RX ADMIN — ONDANSETRON 4 MG: 2 INJECTION INTRAMUSCULAR; INTRAVENOUS at 10:10

## 2022-03-21 RX ADMIN — IOPAMIDOL 95 ML: 755 INJECTION, SOLUTION INTRAVENOUS at 10:18

## 2022-03-21 RX ADMIN — SODIUM CHLORIDE, SODIUM LACTATE, POTASSIUM CHLORIDE, AND CALCIUM CHLORIDE 1000 ML: 600; 310; 30; 20 INJECTION, SOLUTION INTRAVENOUS at 10:11

## 2022-03-21 ASSESSMENT — ENCOUNTER SYMPTOMS
NAUSEA: 1
SHORTNESS OF BREATH: 0
COUGH: 0
BACK PAIN: 0
ABDOMINAL PAIN: 1
DIARRHEA: 1
VOMITING: 1

## 2022-03-21 NOTE — ED PROVIDER NOTES
ALLERGIES     Patient has no known allergies. FAMILY HISTORY     History reviewed. No pertinent family history. SOCIAL HISTORY       Social History     Socioeconomic History    Marital status:      Spouse name: None    Number of children: None    Years of education: None    Highest education level: None   Occupational History    None   Tobacco Use    Smoking status: Current Every Day Smoker     Packs/day: 1.00     Types: Cigarettes    Smokeless tobacco: Never Used   Vaping Use    Vaping Use: Never used   Substance and Sexual Activity    Alcohol use: Yes     Comment: occ    Drug use: Yes     Types: Marijuana Kelechi Budlibertad)     Comment: pt staes medical  marijuana    Sexual activity: None   Other Topics Concern    None   Social History Narrative    None     Social Determinants of Health     Financial Resource Strain:     Difficulty of Paying Living Expenses: Not on file   Food Insecurity:     Worried About Running Out of Food in the Last Year: Not on file    Rashaun of Food in the Last Year: Not on file   Transportation Needs:     Lack of Transportation (Medical): Not on file    Lack of Transportation (Non-Medical):  Not on file   Physical Activity:     Days of Exercise per Week: Not on file    Minutes of Exercise per Session: Not on file   Stress:     Feeling of Stress : Not on file   Social Connections:     Frequency of Communication with Friends and Family: Not on file    Frequency of Social Gatherings with Friends and Family: Not on file    Attends Congregational Services: Not on file    Active Member of Clubs or Organizations: Not on file    Attends Club or Organization Meetings: Not on file    Marital Status: Not on file   Intimate Partner Violence:     Fear of Current or Ex-Partner: Not on file    Emotionally Abused: Not on file    Physically Abused: Not on file    Sexually Abused: Not on file   Housing Stability:     Unable to Pay for Housing in the Last Year: Not on file    Number of Places Lived in the Last Year: Not on file    Unstable Housing in the Last Year: Not on file       SCREENINGS         Rodrigo Coma Scale  Eye Opening: Spontaneous  Best Verbal Response: Oriented  Best Motor Response: Obeys commands  Brohard Coma Scale Score: 15                     CIWA Assessment  BP: (!) 138/115  Pulse: 88                 PHYSICAL EXAM    (up to 7 for level 4, 8 or more for level 5)     ED Triage Vitals   BP Temp Temp Source Pulse Resp SpO2 Height Weight   03/21/22 0904 03/21/22 0900 03/21/22 0900 03/21/22 0904 03/21/22 0904 03/21/22 0904 -- --   (!) 138/115 99 °F (37.2 °C) Oral 88 19 95 %         Physical Exam  Vitals reviewed. Constitutional:       General: She is not in acute distress. Appearance: She is well-developed. She is obese. She is not ill-appearing, toxic-appearing or diaphoretic. HENT:      Head: Normocephalic and atraumatic. Mouth/Throat:      Mouth: Mucous membranes are moist.   Cardiovascular:      Rate and Rhythm: Normal rate and regular rhythm. Heart sounds: Normal heart sounds. Pulmonary:      Effort: Pulmonary effort is normal.      Breath sounds: Normal breath sounds. Abdominal:      General: Bowel sounds are normal.      Palpations: Abdomen is soft. Tenderness: There is abdominal tenderness in the right upper quadrant, right lower quadrant and epigastric area. There is no right CVA tenderness, left CVA tenderness, guarding or rebound. Skin:     General: Skin is warm and dry. Capillary Refill: Capillary refill takes less than 2 seconds. Neurological:      General: No focal deficit present. Mental Status: She is alert and oriented to person, place, and time.          DIAGNOSTIC RESULTS     EKG: All EKG's are interpreted by the Emergency Department Physician who either signs or Co-signs this chart in the absence of a cardiologist.      RADIOLOGY:   Non-plain film images such as CT, Ultrasound and MRI are read by the [facility]. Eileen Dredge radiographic images are visualized and preliminarily interpreted by the emergency physician with the below findings:      Interpretation per the Radiologist below, if available at the time of this note:    CT ABDOMEN PELVIS W IV CONTRAST Additional Contrast? None   Final Result   1. There is mild wall thickening of the right colon. The right colon   is underdistended. Colitis is not excluded. There is submucosal fat   deposition in the colon which can be a normal variant but can also be   seen in patients with chronic inflammatory bowel disease. There is no   small bowel distention. The appendix is normal. Gastric wall   thickening likely due to underdistention. 2. A 3 mm subpleural left lower lobe nodule image #3 series 2. See   Fleischner Society recommendations below. 3. Fatty infiltration of the liver. 4. Left renal cyst.   5. A 3.2 cm fat-containing anterior abdominal wall hernia just above   the umbilicus. Prior hysterectomy. Other nonacute findings, as   discussed above. Fleischner Society Recommendations for Management of SOLID Pulmonary   Nodules:   LOW RISK PATIENT:   1. For a solid nodule less than 6 mm (multiple or solitary), no   follow-up. If suspicious morphology or upper lobe location, consider   12 month follow up. 2.  For a solid nodule between 6 and 8 mm (multiple or solitary), CT   in 6 to 12 months, then consider CT in 18 to 24 months. 3.  For a solid, solitary nodule greater than 8 mm, in 3 months   consider either CT, biopsy, or PET/CT (however, negative PET/CT does   not exclude low-grade malignancy; FDG uptake may be underestimated in   small nodules less than 1 cm, or those close to the diaphragm). 4.  For multiple, solid nodules greater than 8 mm, CT in 3 to 6   months, then consider CT at 18 to 24 months. HIGH-RISK PATIENT:   1. For a solid nodule less than 6 mm (multiple or solitary), optional   CT in 12 months.     2.  For a solid, solitary nodule between 6 and 8 mm, CT in 6 to 12   months, then obtain CT in 18 to 24 months. 3.  For multiple, solid nodules between 6 and 8 mm, CT in 3 to 6   months, then obtain CT in 18 to 24 months. 4.  For a solid, solitary nodule greater than 8 mm, in 3 months   consider either CT, biopsy, or PET/CT (however, negative PET/CT does   not exclude low-grade malignancy; FDG uptake may be underestimated in   small nodules less than 1 cm, or those close to the diaphragm). 5.  For multiple, solid nodules greater than 8 mm, CT in 3 to 6   months, then obtain CT at 18 to 24 months. Signed by Dr Tammi Redmond            ED BEDSIDE ULTRASOUND:   Performed by ED Physician - none    LABS:  Labs Reviewed   CBC WITH AUTO DIFFERENTIAL - Abnormal; Notable for the following components:       Result Value    MCHC 32.3 (*)     RDW 15.6 (*)     Neutrophils % 66.1 (*)     All other components within normal limits   COMPREHENSIVE METABOLIC PANEL W/ REFLEX TO MG FOR LOW K - Abnormal; Notable for the following components:    Glucose 148 (*)     All other components within normal limits   URINALYSIS WITH MICROSCOPIC - Abnormal; Notable for the following components:    Color, UA DARK YELLOW (*)     Ketones, Urine TRACE (*)     Blood, Urine SMALL (*)     Protein, UA 30 (*)     Bacteria, UA NEGATIVE (*)     Crystals, UA NEG (*)     Hyaline Casts, UA 15 (*)     RBC, UA 8 (*)     All other components within normal limits   LIPASE       All other labs were within normal range or not returned as of this dictation. EMERGENCY DEPARTMENT COURSE and DIFFERENTIAL DIAGNOSIS/MDM:   Vitals:    Vitals:    03/21/22 0900 03/21/22 0904   BP:  (!) 138/115   Pulse:  88   Resp:  19   Temp: 99 °F (37.2 °C)    TempSrc: Oral    SpO2:  95%         MDM      REASSESSMENT      Well appearing, nontoxic, tolerating PO. Will treat as colitis. Counseled to treatment plan, follow up instructions and strict return parameters.  Discussed lung nodule and need for follow up with PCP    CRITICAL CARE TIME       CONSULTS:  None    PROCEDURES:  Unless otherwise noted below, none     Procedures         FINAL IMPRESSION      1. Colitis          DISPOSITION/PLAN   DISPOSITION Decision To Discharge 03/21/2022 11:06:35 AM      PATIENT REFERRED TO:  Donny. [address] [place] [id number] [phone]  Call in 1 day        DISCHARGE MEDICATIONS:  New Prescriptions    CIPROFLOXACIN (CIPRO) 500 MG TABLET    Take 1 tablet by mouth 2 times daily for 10 days    METRONIDAZOLE (FLAGYL) 500 MG TABLET    Take 1 tablet by mouth 3 times daily for 10 days    ONDANSETRON (ZOFRAN ODT) 4 MG DISINTEGRATING TABLET    Take 1 tablet by mouth every 8 hours as needed for Nausea or Vomiting     Controlled Substances Monitoring:     No flowsheet data found.     (Please note that portions of this note were completed with a voice recognition program.  Efforts were made to edit the dictations but occasionally words are mis-transcribed.)    EDGAR Saul CNP (electronically signed)  Attending Emergency Physician         EDGAR Saul CNP  03/21/22 [id number]

## 2022-03-21 NOTE — ED TRIAGE NOTES
Pt states she has had right side abd pain with vomiting that has last for 10 days or more and comes and goes

## 2022-04-04 ENCOUNTER — HOSPITAL ENCOUNTER (EMERGENCY)
Age: 42
Discharge: HOME | End: 2022-04-04
Attending: EMERGENCY MEDICINE
Payer: MEDICAID

## 2022-04-04 VITALS
OXYGEN SATURATION: 97 % | DIASTOLIC BLOOD PRESSURE: 104 MMHG | HEART RATE: 87 BPM | SYSTOLIC BLOOD PRESSURE: 128 MMHG | TEMPERATURE: 98 F | RESPIRATION RATE: 18 BRPM

## 2022-04-04 DIAGNOSIS — L02.211 ABDOMINAL WALL ABSCESS: Primary | ICD-10-CM

## 2022-04-04 PROCEDURE — 6370000000 HC RX 637 (ALT 250 FOR IP): Performed by: EMERGENCY MEDICINE

## 2022-04-04 PROCEDURE — 99283 EMERGENCY DEPT VISIT LOW MDM: CPT

## 2022-04-04 PROCEDURE — 2500000003 HC RX 250 WO HCPCS: Performed by: EMERGENCY MEDICINE

## 2022-04-04 PROCEDURE — 10061 I&D ABSCESS COMP/MULTIPLE: CPT

## 2022-04-04 RX ORDER — BUPIVACAINE HYDROCHLORIDE AND EPINEPHRINE 5; 5 MG/ML; UG/ML
10 INJECTION, SOLUTION EPIDURAL; INTRACAUDAL; PERINEURAL ONCE
Status: COMPLETED | OUTPATIENT
Start: 2022-04-04 | End: 2022-04-04

## 2022-04-04 RX ORDER — OXYCODONE AND ACETAMINOPHEN 10; 325 MG/1; MG/1
1 TABLET ORAL ONCE
Status: COMPLETED | OUTPATIENT
Start: 2022-04-04 | End: 2022-04-04

## 2022-04-04 RX ORDER — SULFAMETHOXAZOLE AND TRIMETHOPRIM 800; 160 MG/1; MG/1
1 TABLET ORAL 2 TIMES DAILY
Qty: 20 TABLET | Refills: 0 | Status: SHIPPED | OUTPATIENT
Start: 2022-04-04 | End: 2022-04-14

## 2022-04-04 RX ADMIN — BUPIVACAINE HYDROCHLORIDE AND EPINEPHRINE BITARTRATE 10 ML: 5; .005 INJECTION, SOLUTION EPIDURAL; INTRACAUDAL; PERINEURAL at 13:50

## 2022-04-04 RX ADMIN — OXYCODONE AND ACETAMINOPHEN 1 TABLET: 10; 325 TABLET ORAL at 13:32

## 2022-04-04 ASSESSMENT — ENCOUNTER SYMPTOMS
EYE REDNESS: 0
VOICE CHANGE: 0
ABDOMINAL PAIN: 0
VOMITING: 0
COUGH: 0
DIARRHEA: 0
EYE PAIN: 0
SHORTNESS OF BREATH: 0
RHINORRHEA: 0

## 2022-04-04 ASSESSMENT — PAIN SCALES - GENERAL
PAINLEVEL_OUTOF10: 3
PAINLEVEL_OUTOF10: 7

## 2022-04-04 ASSESSMENT — PAIN - FUNCTIONAL ASSESSMENT: PAIN_FUNCTIONAL_ASSESSMENT: 0-10

## 2022-04-04 ASSESSMENT — PAIN DESCRIPTION - LOCATION: LOCATION: ABDOMEN

## 2022-04-04 ASSESSMENT — PAIN DESCRIPTION - ORIENTATION: ORIENTATION: RIGHT;LOWER

## 2022-04-04 NOTE — ED PROVIDER NOTES
[facility]  EMERGENCY DEPARTMENT ENCOUNTER      Pt Name: Apolinar Guadarrama  MRN: [id number]  [place] 1980  Date of evaluation: 4/4/2022  Provider: Di Dickson MD    CHIEF COMPLAINT       Chief Complaint   Patient presents with    Abscess     x 1 year started getting red and draining a few weeks ago         HISTORY OF PRESENT ILLNESS   (Location/Symptom, Timing/Onset,Context/Setting, Quality, Duration, Modifying Factors, Severity)  Note limiting factors. Apolinar Guadarrama is a 35 y.o. female who presents to the emergency department for evaluation of abscess to the right lower abdominal wall. States she has had a cyst in that location for over a year that did not cause her any significant issues and did not have any associated skin changes. Over the last week the area has become more painful and red. Yesterday it started draining pus. Denies any fevers or other systemic symptoms. Has a history of multiple prior cysts especially in her bikini line that have required excision by a surgeon in the past.  States she has not been diagnosed with hidradenitis or any other specific condition    HPI    NursingNotes were reviewed. REVIEW OF SYSTEMS    (2-9 systems for level 4, 10 or more for level 5)     Review of Systems   Constitutional: Negative for fatigue and fever. HENT: Negative for congestion, rhinorrhea and voice change. Eyes: Negative for pain and redness. Respiratory: Negative for cough and shortness of breath. Cardiovascular: Negative for chest pain. Gastrointestinal: Negative for abdominal pain, diarrhea and vomiting. Endocrine: Negative. Genitourinary: Negative. Musculoskeletal: Negative for arthralgias and gait problem. Skin: Positive for wound. Negative for rash. Neurological: Negative for weakness and headaches. Hematological: Negative. Psychiatric/Behavioral: Negative. All other systems reviewed and are negative.       A complete review of systems was performed and is negative except as noted above in the HPI. PAST MEDICAL HISTORY   History reviewed. No pertinent past medical history. SURGICAL HISTORY       Past Surgical History:   Procedure Laterality Date    FRACTURE SURGERY      forearm    HYSTERECTOMY           CURRENT MEDICATIONS       Discharge Medication List as of 4/4/2022  1:51 PM      CONTINUE these medications which have NOT CHANGED    Details   ondansetron (ZOFRAN ODT) 4 MG disintegrating tablet Take 1 tablet by mouth every 8 hours as needed for Nausea or Vomiting, Disp-10 tablet, R-0Normal             ALLERGIES     Patient has no known allergies. FAMILY HISTORY     History reviewed. No pertinent family history. SOCIAL HISTORY       Social History     Socioeconomic History    Marital status:      Spouse name: None    Number of children: None    Years of education: None    Highest education level: None   Occupational History    None   Tobacco Use    Smoking status: Current Every Day Smoker     Packs/day: 1.00     Types: Cigarettes    Smokeless tobacco: Never Used   Vaping Use    Vaping Use: Never used   Substance and Sexual Activity    Alcohol use: Yes     Comment: occ    Drug use: Yes     Types: Marijuana Gaynelle Van Horne)     Comment: pt states medical  marijuana    Sexual activity: None   Other Topics Concern    None   Social History Narrative    None     Social Determinants of Health     Financial Resource Strain:     Difficulty of Paying Living Expenses: Not on file   Food Insecurity:     Worried About Running Out of Food in the Last Year: Not on file    Rashaun of Food in the Last Year: Not on file   Transportation Needs:     Lack of Transportation (Medical): Not on file    Lack of Transportation (Non-Medical):  Not on file   Physical Activity:     Days of Exercise per Week: Not on file    Minutes of Exercise per Session: Not on file   Stress:     Feeling of Stress : Not on file   Social Connections:  Frequency of Communication with Friends and Family: Not on file    Frequency of Social Gatherings with Friends and Family: Not on file    Attends Jain Services: Not on file    Active Member of Clubs or Organizations: Not on file    Attends Club or Organization Meetings: Not on file    Marital Status: Not on file   Intimate Partner Violence:     Fear of Current or Ex-Partner: Not on file    Emotionally Abused: Not on file    Physically Abused: Not on file    Sexually Abused: Not on file   Housing Stability:     Unable to Pay for Housing in the Last Year: Not on file    Number of [place] in the Last Year: Not on file    Unstable Housing in the Last Year: Not on file       SCREENINGS    Rodrigo Coma Scale  Eye Opening: Spontaneous  Best Verbal Response: Oriented  Best Motor Response: Obeys commands  Rodrigo Coma Scale Score: 15        PHYSICAL EXAM    (up to 7 for level 4, 8 or more for level 5)     ED Triage Vitals [04/04/22 1141]   BP Temp Temp src Pulse Resp SpO2 Height Weight   (!) 128/104 98 °F (36.7 °C) -- 87 18 97 % -- --       Physical Exam  Vitals and nursing note reviewed. Constitutional:       General: She is not in acute distress. Appearance: She is well-developed. She is not toxic-appearing or diaphoretic. HENT:      Head: Normocephalic and atraumatic. Eyes:      General: No scleral icterus. Right eye: No discharge. Left eye: No discharge. Pupils: Pupils are equal, round, and reactive to light. Cardiovascular:      Rate and Rhythm: Normal rate and regular rhythm. Pulmonary:      Effort: Pulmonary effort is normal. No respiratory distress. Breath sounds: No stridor. Abdominal:      General: There is no distension. Musculoskeletal:         General: No deformity. Normal range of motion. Cervical back: Normal range of motion. Skin:     General: Skin is warm and dry.           Neurological:      Mental Status: She is alert and oriented to person, place, and time. GCS: GCS eye subscore is 4. GCS verbal subscore is 5. GCS motor subscore is 6. Cranial Nerves: No cranial nerve deficit. Motor: No abnormal muscle tone. Psychiatric:         Behavior: Behavior normal.         Thought Content: Thought content normal.         Judgment: Judgment normal.         DIAGNOSTIC RESULTS     EKG: All EKG's are interpreted by the Emergency Department Physician who either signs or Co-signs this chart in the absence of a cardiologist.        RADIOLOGY:   Non-plain film images such as CT, Ultrasound and MRI are read by the radiologist. Plainradiographic images are visualized and preliminarily interpreted by the emergency physician with the below findings:        Interpretation per the Radiologist below, if available at the time of this note:    No orders to display         ED BEDSIDE ULTRASOUND:   Performed by ED Physician - none    LABS:  Labs Reviewed - No data to display    All other labs were within normal range or not returned as of this dictation. Medications   oxyCODONE-acetaminophen (PERCOCET)  MG per tablet 1 tablet (1 tablet Oral Given 4/4/22 1332)   bupivacaine-EPINEPHrine PF (MARCAINE-w/EPINEPHRINE) 0.5% -1:922300 injection 10 mL (10 mLs IntraPLEUral Given 4/4/22 1350)       EMERGENCY DEPARTMENT COURSE and DIFFERENTIALDIAGNOSIS/MDM:   Vitals:    Vitals:    04/04/22 1141   BP: (!) 128/104   Pulse: 87   Resp: 18   Temp: 98 °F (36.7 °C)   SpO2: 97%       MDM  Evaluation and work-up here revealed no signs of emergent or life-threatening pathology that would necessitate admission for further work-up or management at this time. Patient is felt to be stable for discharge home with return precautions for worsening of the condition or development of new concerning symptoms. Patient was encouraged to follow-up with their primary care doctor in the appropriate timeframe.   Necessary prescriptions and information have been provided for treatment at home. Patient voices understanding and agreement with the plan. CONSULTS:  None    PROCEDURES:  Unless otherwise notedbelow, none     Incision/Drainage    Date/Time: 4/4/2022 2:07 PM  Performed by: Gisell Umana MD  Authorized by: Gisell Umana MD     Consent:     Consent obtained:  Verbal    Consent given by:  Patient    Risks discussed:  Bleeding, incomplete drainage, infection and pain    Alternatives discussed:  Alternative treatment  Location:     Type:  Abscess  Pre-procedure details:     Skin preparation:  Betadine  Anesthesia (see MAR for exact dosages): Anesthesia method:  Local infiltration    Local anesthetic:  Bupivacaine 0.5% WITH epi  Procedure type:     Complexity:  Complex  Procedure details:     Incision types:  Single straight    Incision depth:  Subcutaneous    Scalpel blade:  11    Wound management:  Probed and deloculated    Drainage:  Purulent    Drainage amount:  Scant    Wound treatment:  Wound left open    Packing materials:  None  Post-procedure details:     Patient tolerance of procedure: Tolerated well, no immediate complications          FINAL IMPRESSION     1.  Abdominal wall abscess          DISPOSITION/PLAN   DISPOSITION Decision To Discharge 04/04/2022 01:11:29 PM      PATIENT REFERRED TO:  [facility] EMERGENCY DEPT  [facility]  [phone]    If symptoms worsen    Marisol Siu MD  [address] [place] [address] [phone]      As needed      DISCHARGE MEDICATIONS:  Discharge Medication List as of 4/4/2022  1:51 PM      START taking these medications    Details   sulfamethoxazole-trimethoprim (BACTRIM DS) 800-160 MG per tablet Take 1 tablet by mouth 2 times daily for 10 days, Disp-20 tablet, R-0Normal                (Please note that portions of this note were completed with a voice recognition program.  Efforts were made to edit the dictations butoccasionally words are mis-transcribed.)    Gisell Gonzalez MD (electronically signed)  AttendingEmergency Physician         Mateusz Stauffer., MD  04/04/22 [id number]

## 2023-06-25 ENCOUNTER — HOSPITAL ENCOUNTER (EMERGENCY)
Age: 43
Discharge: HOME | End: 2023-06-25
Payer: MEDICAID

## 2023-06-25 VITALS
TEMPERATURE: 99.2 F | DIASTOLIC BLOOD PRESSURE: 86 MMHG | WEIGHT: 215 LBS | RESPIRATION RATE: 18 BRPM | HEART RATE: 81 BPM | SYSTOLIC BLOOD PRESSURE: 159 MMHG | OXYGEN SATURATION: 93 %

## 2023-06-25 DIAGNOSIS — N64.4 BREAST PAIN, LEFT: ICD-10-CM

## 2023-06-25 DIAGNOSIS — Q83.9 BREAST ANOMALY: Primary | ICD-10-CM

## 2023-06-25 PROCEDURE — 99283 EMERGENCY DEPT VISIT LOW MDM: CPT

## 2023-06-25 RX ORDER — HYDROCODONE BITARTRATE AND ACETAMINOPHEN 5; 325 MG/1; MG/1
1 TABLET ORAL EVERY 6 HOURS PRN
Qty: 12 TABLET | Refills: 0 | Status: SHIPPED | OUTPATIENT
Start: 2023-06-25 | End: 2023-06-28

## 2023-06-25 RX ORDER — SULFAMETHOXAZOLE AND TRIMETHOPRIM 800; 160 MG/1; MG/1
1 TABLET ORAL 2 TIMES DAILY
Qty: 20 TABLET | Refills: 0 | Status: SHIPPED | OUTPATIENT
Start: 2023-06-25 | End: 2023-07-05

## 2023-06-25 ASSESSMENT — ENCOUNTER SYMPTOMS: BREAST PAIN: 1

## 2023-08-19 ENCOUNTER — HOSPITAL ENCOUNTER (EMERGENCY)
Age: 43
Discharge: HOME | End: 2023-08-19
Payer: MEDICAID

## 2023-08-19 VITALS
BODY MASS INDEX: 33.43 KG/M2 | HEIGHT: 67 IN | TEMPERATURE: 98.4 F | WEIGHT: 213 LBS | OXYGEN SATURATION: 95 % | RESPIRATION RATE: 18 BRPM | HEART RATE: 75 BPM | SYSTOLIC BLOOD PRESSURE: 113 MMHG | DIASTOLIC BLOOD PRESSURE: 76 MMHG

## 2023-08-19 DIAGNOSIS — B35.6 GROIN RINGWORM: Primary | ICD-10-CM

## 2023-08-19 PROCEDURE — 99283 EMERGENCY DEPT VISIT LOW MDM: CPT

## 2023-08-19 RX ORDER — QUETIAPINE FUMARATE 200 MG/1
200 TABLET, FILM COATED ORAL NIGHTLY
COMMUNITY

## 2023-08-19 RX ORDER — PAROXETINE 10 MG/1
10 TABLET, FILM COATED ORAL DAILY
COMMUNITY

## 2023-08-19 RX ORDER — DIAZEPAM 10 MG/1
TABLET ORAL
COMMUNITY
Start: 2021-12-06

## 2023-08-19 RX ORDER — LAMOTRIGINE 150 MG/1
150 TABLET ORAL DAILY
COMMUNITY

## 2023-08-19 RX ORDER — CLOTRIMAZOLE 1 %
CREAM (GRAM) TOPICAL
Qty: 1 EACH | Refills: 0 | Status: SHIPPED | OUTPATIENT
Start: 2023-08-19 | End: 2023-09-02

## 2023-08-19 ASSESSMENT — ENCOUNTER SYMPTOMS
VOMITING: 0
DIARRHEA: 0
COUGH: 0
SHORTNESS OF BREATH: 0
NAUSEA: 0

## 2024-04-29 ENCOUNTER — HOSPITAL ENCOUNTER (EMERGENCY)
Age: 44
Discharge: HOME | End: 2024-04-29

## 2024-04-29 VITALS
HEART RATE: 109 BPM | SYSTOLIC BLOOD PRESSURE: 132 MMHG | WEIGHT: 226 LBS | BODY MASS INDEX: 35.4 KG/M2 | DIASTOLIC BLOOD PRESSURE: 81 MMHG | RESPIRATION RATE: 18 BRPM | TEMPERATURE: 99.3 F | OXYGEN SATURATION: 93 %

## 2024-04-29 DIAGNOSIS — L08.9 LOCAL INFECTION OF SKIN AND SUBCUTANEOUS TISSUE: ICD-10-CM

## 2024-04-29 DIAGNOSIS — L30.9 DERMATITIS: Primary | ICD-10-CM

## 2024-04-29 PROCEDURE — 99283 EMERGENCY DEPT VISIT LOW MDM: CPT

## 2024-04-29 RX ORDER — CEPHALEXIN 500 MG/1
500 CAPSULE ORAL 2 TIMES DAILY
Qty: 14 CAPSULE | Refills: 0 | Status: SHIPPED | OUTPATIENT
Start: 2024-04-29 | End: 2024-05-06

## 2024-04-29 RX ORDER — FLUCONAZOLE 150 MG/1
150 TABLET ORAL
Qty: 2 TABLET | Refills: 0 | Status: SHIPPED | OUTPATIENT
Start: 2024-04-29 | End: 2024-05-05

## 2024-04-29 ASSESSMENT — ENCOUNTER SYMPTOMS: RESPIRATORY NEGATIVE: 1

## 2024-04-29 NOTE — ED PROVIDER NOTES
[facility] EMERGENCY DEPT  EMERGENCY DEPARTMENT ENCOUNTER      Pt Name: Mena Krishnamurthy  MRN: [id number]  Birthdate 1980  Date of evaluation: 4/29/2024  Provider: EDGAR Pérez NP    CHIEF COMPLAINT       Chief Complaint   Patient presents with    Breast Discharge    Rash     Pelvic region          HISTORY OF PRESENT ILLNESS   (Location/Symptom, Timing/Onset,Context/Setting, Quality, Duration, Modifying Factors, Severity)  Note limiting factors.     Mena Krishnamurthy is a 43 y.o. female who presents to the emergency department with complaint of blood from her left nipple after squeezing it because she believed she has a cyst.  She reports that some thick substance came out then blood.  She denies any fever or chills.  She also informs that she has a rash to her left groin.  She states that she put \"scalding hot water\" on it to try and kill the germs.  She has had issues with the left breast and rash to her groin in the past.  This is confirmed from prior medical records.      The history is provided by the patient.       NursingNotes were reviewed.    REVIEW OF SYSTEMS    (2-9 systems for level 4, 10 or more for level 5)     Review of Systems   Constitutional:  Negative for chills and fever.   Respiratory: Negative.     Cardiovascular: Negative.    Genitourinary: Negative.    Skin:  Positive for rash.        Complaint of blood from the left nipple       A complete review of systems was performed and is negative except as noted above in the HPI.       PAST MEDICAL HISTORY   History reviewed. No pertinent past medical history.      SURGICAL HISTORY       Past Surgical History:   Procedure Laterality Date    FRACTURE SURGERY      forearm    HYSTERECTOMY (CERVIX STATUS UNKNOWN)           CURRENT MEDICATIONS       Previous Medications    DIAZEPAM (VALIUM) 10 MG TABLET        LAMOTRIGINE (LAMICTAL) 150 MG TABLET    Take 1 tablet by mouth daily    PAROXETINE (PAXIL) 10 MG TABLET    Take 1 tablet by mouth

## 2024-04-29 NOTE — DISCHARGE INSTRUCTIONS
Take all of the cefalexin antibiotic.  When the cefalexin has been taken, start the Diflucan.  Do not pick or agitate the skin.  No ointment to the rash in your groin.  If symptoms have not improved within 3 to 4 days, follow-up with your provider or dermatology.  Return to ER for any new, worsening, or change in condition.

## 2024-09-12 ENCOUNTER — HOSPITAL ENCOUNTER (EMERGENCY)
Age: 44
Discharge: HOME | End: 2024-09-12

## 2024-09-12 VITALS
TEMPERATURE: 97.5 F | HEART RATE: 74 BPM | SYSTOLIC BLOOD PRESSURE: 122 MMHG | RESPIRATION RATE: 18 BRPM | OXYGEN SATURATION: 95 % | WEIGHT: 225 LBS | DIASTOLIC BLOOD PRESSURE: 73 MMHG | BODY MASS INDEX: 35.24 KG/M2

## 2024-09-12 DIAGNOSIS — B35.6 GROIN RINGWORM: Primary | ICD-10-CM

## 2024-09-12 PROCEDURE — 99283 EMERGENCY DEPT VISIT LOW MDM: CPT

## 2024-09-12 RX ORDER — CLOTRIMAZOLE 1 %
CREAM (GRAM) TOPICAL
Qty: 1 EACH | Refills: 1 | Status: SHIPPED | OUTPATIENT
Start: 2024-09-12 | End: 2024-09-19

## 2024-12-01 ENCOUNTER — HOSPITAL ENCOUNTER (EMERGENCY)
Facility: HOSPITAL | Age: 44
Discharge: HOME | End: 2024-12-01
Admitting: EMERGENCY MEDICINE

## 2024-12-01 ENCOUNTER — APPOINTMENT (OUTPATIENT)
Dept: CT IMAGING | Facility: HOSPITAL | Age: 44
End: 2024-12-01

## 2024-12-01 VITALS
OXYGEN SATURATION: 94 % | HEART RATE: 67 BPM | TEMPERATURE: 97.7 F | WEIGHT: 225 LBS | SYSTOLIC BLOOD PRESSURE: 127 MMHG | BODY MASS INDEX: 35.31 KG/M2 | RESPIRATION RATE: 16 BRPM | DIASTOLIC BLOOD PRESSURE: 84 MMHG | HEIGHT: 67 IN

## 2024-12-01 DIAGNOSIS — L72.3 INFLAMED SEBACEOUS CYST: Primary | ICD-10-CM

## 2024-12-01 LAB
ALBUMIN SERPL-MCNC: 4.5 G/DL (ref 3.5–5.2)
ALBUMIN/GLOB SERPL: 1.7 G/DL
ALP SERPL-CCNC: 93 U/L (ref 39–117)
ALT SERPL W P-5'-P-CCNC: 20 U/L (ref 1–33)
ANION GAP SERPL CALCULATED.3IONS-SCNC: 12 MMOL/L (ref 5–15)
AST SERPL-CCNC: 21 U/L (ref 1–32)
BASOPHILS # BLD AUTO: 0.03 10*3/MM3 (ref 0–0.2)
BASOPHILS NFR BLD AUTO: 0.4 % (ref 0–1.5)
BILIRUB SERPL-MCNC: 0.4 MG/DL (ref 0–1.2)
BUN SERPL-MCNC: 8 MG/DL (ref 6–20)
BUN/CREAT SERPL: 7.6 (ref 7–25)
CALCIUM SPEC-SCNC: 9.1 MG/DL (ref 8.6–10.5)
CHLORIDE SERPL-SCNC: 100 MMOL/L (ref 98–107)
CO2 SERPL-SCNC: 27 MMOL/L (ref 22–29)
CREAT SERPL-MCNC: 1.05 MG/DL (ref 0.57–1)
DEPRECATED RDW RBC AUTO: 50.5 FL (ref 37–54)
EGFRCR SERPLBLD CKD-EPI 2021: 66.9 ML/MIN/1.73
EOSINOPHIL # BLD AUTO: 0.22 10*3/MM3 (ref 0–0.4)
EOSINOPHIL NFR BLD AUTO: 2.7 % (ref 0.3–6.2)
ERYTHROCYTE [DISTWIDTH] IN BLOOD BY AUTOMATED COUNT: 14.6 % (ref 12.3–15.4)
GLOBULIN UR ELPH-MCNC: 2.6 GM/DL
GLUCOSE SERPL-MCNC: 103 MG/DL (ref 65–99)
HCT VFR BLD AUTO: 44.2 % (ref 34–46.6)
HGB BLD-MCNC: 14.5 G/DL (ref 12–15.9)
IMM GRANULOCYTES # BLD AUTO: 0.01 10*3/MM3 (ref 0–0.05)
IMM GRANULOCYTES NFR BLD AUTO: 0.1 % (ref 0–0.5)
LYMPHOCYTES # BLD AUTO: 3.92 10*3/MM3 (ref 0.7–3.1)
LYMPHOCYTES NFR BLD AUTO: 47.7 % (ref 19.6–45.3)
MCH RBC QN AUTO: 31 PG (ref 26.6–33)
MCHC RBC AUTO-ENTMCNC: 32.8 G/DL (ref 31.5–35.7)
MCV RBC AUTO: 94.6 FL (ref 79–97)
MONOCYTES # BLD AUTO: 0.5 10*3/MM3 (ref 0.1–0.9)
MONOCYTES NFR BLD AUTO: 6.1 % (ref 5–12)
NEUTROPHILS NFR BLD AUTO: 3.53 10*3/MM3 (ref 1.7–7)
NEUTROPHILS NFR BLD AUTO: 43 % (ref 42.7–76)
NRBC BLD AUTO-RTO: 0 /100 WBC (ref 0–0.2)
PLATELET # BLD AUTO: 290 10*3/MM3 (ref 140–450)
PMV BLD AUTO: 9.6 FL (ref 6–12)
POTASSIUM SERPL-SCNC: 3.7 MMOL/L (ref 3.5–5.2)
PROT SERPL-MCNC: 7.1 G/DL (ref 6–8.5)
RBC # BLD AUTO: 4.67 10*6/MM3 (ref 3.77–5.28)
SODIUM SERPL-SCNC: 139 MMOL/L (ref 136–145)
WBC NRBC COR # BLD AUTO: 8.21 10*3/MM3 (ref 3.4–10.8)

## 2024-12-01 PROCEDURE — 25510000001 IOPAMIDOL 61 % SOLUTION: Performed by: EMERGENCY MEDICINE

## 2024-12-01 PROCEDURE — 80053 COMPREHEN METABOLIC PANEL: CPT | Performed by: EMERGENCY MEDICINE

## 2024-12-01 PROCEDURE — 70487 CT MAXILLOFACIAL W/DYE: CPT

## 2024-12-01 PROCEDURE — 99285 EMERGENCY DEPT VISIT HI MDM: CPT

## 2024-12-01 PROCEDURE — 85025 COMPLETE CBC W/AUTO DIFF WBC: CPT | Performed by: EMERGENCY MEDICINE

## 2024-12-01 RX ORDER — SULFAMETHOXAZOLE AND TRIMETHOPRIM 800; 160 MG/1; MG/1
1 TABLET ORAL 2 TIMES DAILY
Qty: 14 TABLET | Refills: 0 | Status: SHIPPED | OUTPATIENT
Start: 2024-12-01 | End: 2024-12-08

## 2024-12-01 RX ORDER — CEPHALEXIN 500 MG/1
500 CAPSULE ORAL 4 TIMES DAILY
Qty: 28 CAPSULE | Refills: 0 | Status: SHIPPED | OUTPATIENT
Start: 2024-12-01 | End: 2024-12-08

## 2024-12-01 RX ORDER — SODIUM CHLORIDE 0.9 % (FLUSH) 0.9 %
10 SYRINGE (ML) INJECTION AS NEEDED
Status: DISCONTINUED | OUTPATIENT
Start: 2024-12-01 | End: 2024-12-01 | Stop reason: HOSPADM

## 2024-12-01 RX ORDER — IOPAMIDOL 612 MG/ML
100 INJECTION, SOLUTION INTRAVASCULAR
Status: COMPLETED | OUTPATIENT
Start: 2024-12-01 | End: 2024-12-01

## 2024-12-01 RX ADMIN — IOPAMIDOL 100 ML: 612 INJECTION, SOLUTION INTRAVENOUS at 13:47

## 2024-12-01 NOTE — DISCHARGE INSTRUCTIONS
It was very nice to meet you, Minnie. Thank you for allowing us to take care of you today at [facility].    Today you were seen in the emergency department for your symptoms. Please understand that an ER evaluation is just the start of your evaluation. We do the best we can, but we are often unable to fully find what is causing your symptoms from one evaluation.  Because of this, the goal is to determine whether you need to be evaluated in the hospital or if it is safe for you to go home and see other doctors provided such as primary care physicians or specialist on an outpatient basis.     Like we discussed, I strongly urge that you follow up with your primary care doctor, eye doctor, and ENT/plastic surgery. Please call their office to set up an appointment as soon as possible so that you can be re-evaluated for improvement in your symptoms or for any other questions.  I have provided the information needed, including phone number, to call to set up an appointment below in these discharge papers.     Educational material has also been provided in the following pages regarding what we have discussed today.     MEDICATIONS PRESCRIBED: 2 antibiotics.  Please take these as prescribed.    Please return to the emergency room within 12-48 hours if you experience symptoms such as the following:   Fever, chills, chest pain or shortness of breath, pain with inspiration/expiration, pain that travels to your arms, neck or back, nausea, vomiting, severe headache, tearing pain in your chest, dizziness, feel as though you are about to pass out, OR if you have any worsening symptoms, or any other concerns.

## 2024-12-01 NOTE — ED PROVIDER NOTES
"Subjective   History of Present Illness  Patient is a 43-year-old female that presents to the emergency department for complaints of possible cyst or abscess just below the left eye.  Patient reports she noticed a small \"pebble\" to this specific area about a year ago.  She states that she had no issues or change in size or appearance for the last year until 2 days ago.  Patient reports she noticed that the area of concern started growing in size and became red and painful to touch.  Patient denies any drainage from the localized area.  She states all pain is localized to the area just below the left eye.  Patient does report blurred vision but reports she feels it is related to the swelling just below her left eye.  She states she does wear corrective lenses but has not seen a primary care provider or ophthalmologist for this complaint as she does not have insurance.  Denies any fevers, body aches, chills, cough or congestion.  Denies any chest pain or shortness of breath.  Denies any lightheadedness, dizziness, headache or near syncope.  Denies any urinary or neurologic complaints.          Review of Systems   Skin:  Positive for wound.   All other systems reviewed and are negative.      Past Medical History:   Diagnosis Date    Bipolar 1 disorder     Hep C w/o coma, chronic     Herniated intervertebral disc of lumbar spine     PTSD (post-traumatic stress disorder)        No Known Allergies    Past Surgical History:   Procedure Laterality Date    GROIN ABSCESS INCISION AND DRAINAGE Left 10/28/2016    Procedure: GROIN ABSCESS INCISION AND DRAINAGE;  Surgeon: Raquel Mclaughlin MD;  Location: [facility];  Service:     HAND SURGERY      HYSTERECTOMY         History reviewed. No pertinent family history.    Social History     Socioeconomic History    Marital status:    Tobacco Use    Smoking status: Every Day     Current packs/day: 0.50     Average packs/day: 0.5 packs/day for 15.0 years (7.5 ttl pk-yrs)     Types: " Cigarettes   Substance and Sexual Activity    Alcohol use: No    Drug use: Yes     Types: Marijuana     Comment: 2 DAYS AGO    Sexual activity: Defer           Objective   Physical Exam  Vitals and nursing note reviewed.   Constitutional:       Appearance: Normal appearance.      Comments: Nontoxic appearing. In no acute distress.    HENT:      Head: Normocephalic and atraumatic.      Right Ear: External ear normal.      Left Ear: External ear normal.      Nose: Nose normal.      Mouth/Throat:      Mouth: Mucous membranes are moist.      Pharynx: Oropharynx is clear.   Eyes:      General: Lids are normal. Vision grossly intact. Gaze aligned appropriately.         Right eye: No foreign body, discharge or hordeolum.         Left eye: No foreign body, discharge or hordeolum.      Extraocular Movements: Extraocular movements intact.      Right eye: Normal extraocular motion and no nystagmus.      Left eye: Normal extraocular motion and no nystagmus.      Conjunctiva/sclera: Conjunctivae normal.      Pupils: Pupils are equal, round, and reactive to light.      Comments: Area of erythema and swelling consistent with possible inflamed cyst vs abscess noted just below the left eye.  No drainage noted.  Area is firm to touch.  No fluctuance noted.  No facial swelling noted other than to the localized area of concern.   Cardiovascular:      Rate and Rhythm: Normal rate and regular rhythm.      Pulses: Normal pulses.      Heart sounds: Normal heart sounds.   Pulmonary:      Effort: Pulmonary effort is normal. No respiratory distress.      Breath sounds: Normal breath sounds. No wheezing.   Chest:      Chest wall: No tenderness.   Abdominal:      General: Bowel sounds are normal. There is no distension.      Palpations: Abdomen is soft.      Tenderness: There is no abdominal tenderness. There is no right CVA tenderness, guarding or rebound.   Musculoskeletal:         General: Normal range of motion.      Cervical back: Normal  range of motion and neck supple.      Right lower leg: No edema.      Left lower leg: No edema.   Skin:     General: Skin is warm and dry.      Capillary Refill: Capillary refill takes less than 2 seconds.   Neurological:      General: No focal deficit present.      Mental Status: She is alert and oriented to person, place, and time. Mental status is at baseline.   Psychiatric:         Mood and Affect: Mood normal.         Behavior: Behavior normal.         Thought Content: Thought content normal.         Judgment: Judgment normal.       Labs Reviewed   COMPREHENSIVE METABOLIC PANEL - Abnormal; Notable for the following components:       Result Value    Glucose 103 (*)     Creatinine 1.05 (*)     All other components within normal limits    Narrative:     GFR Normal >60  Chronic Kidney Disease <60  Kidney Failure <15     CBC WITH AUTO DIFFERENTIAL - Abnormal; Notable for the following components:    Lymphocyte % 47.7 (*)     Lymphocytes, Absolute 3.92 (*)     All other components within normal limits   CBC AND DIFFERENTIAL    Narrative:     The following orders were created for panel order CBC & Differential.  Procedure                               Abnormality         Status                     ---------                               -----------         ------                     CBC Auto Differential[[id number]]        Abnormal            Final result                 Please view results for these tests on the individual orders.      CT Facial Bones With Contrast   Final Result   1. Small encapsulated oval 9 mm subcutaneous fluid collection in the   left infraorbital soft tissues with adjacent mild induration. There is   no gas within this structure. Differential possibilities include an   epidermal inclusion cyst, sebaceous cyst or potentially a small abscess.   There is no associated osseous abnormality in the orbits and   intraorbital contents appear unremarkable.               This report was signed and  "finalized on 12/1/2024 2:26 PM by Dr. Abelino Thompson MD.               Procedures           ED Course                                                       Medical Decision Making  Minnie Celaya is a 43 y.o. female who presents to the ED for complaints of possible cyst or abscess just below the left eye.  Patient reports she noticed a small \"pebble\" to this specific area about a year ago.  She states that she had no issues or change in size or appearance for the last year until 2 days ago.  Patient reports she noticed that the area of concern started growing in size and became red and painful to touch.  Patient denies any drainage from the localized area.  She states all pain is localized to the area just below the left eye.  Patient does report blurred vision but reports she feels it is related to the swelling just below her left eye.  She states she does wear corrective lenses but has not seen a primary care provider or ophthalmologist for this complaint as she does not have insurance.  Denies any fevers, body aches, chills, cough or congestion.  Denies any chest pain or shortness of breath.  Denies any lightheadedness, dizziness, headache or near syncope.  Denies any urinary or neurologic complaints.    Patient was non-toxic appearing on arrival. No acute distress was noted.  Vital signs stable.  Afebrile.    Patient's presentation raises suspicion for differentials including, but not limited to, sebaceous cyst, abscess, cellulitis, lipoma.    Past medical history, surgical history, and medication regimen reviewed.     Please refer to above section of note for lab and imaging results that were reviewed and interpreted by radiology as well as attending physician.     Given findings described above, patient's presentation is likely consistent with inflamed sebaceous cyst.     I had an in-depth discussion with the patient as well as family present at bedside regarding all lab and imaging results completed " during today's ED encounter.  Discussed that due to area being so firm with no fluctuance in addition to possibly being a cyst rather than abscess do not feel that performing I&D at this time would be appropriate.  Discussed the patient will be discharged with 2 prescriptions for antibiotics and encouraged patient to follow-up with PCP, ENT/plastic surgery, and her ophthalmologist.  Discussed that patient was also provided with information to obtain these providers as well on discharge paperwork.  Patient and family were educated on keeping the area clean and dry and refraining from trying to drain the area at home.  Patient and family were educated on concerning signs and symptoms that would warrant a quick return to the ED and verbalized understanding of this. I answered all the questions regarding the emergency department evaluation, diagnosis, and treatment plan in plain and simple language that was understandable. We discussed that due to always having some diagnostic uncertainty while in the ER, there is always a chance that symptoms may change or new symptoms may reveal themselves after being discharged. Because of this, I stressed the importance of Minnie following up with their PCP, ENT/plastic surgery, and ophthalmologist. Patient informed that appointment will need to be done by calling their office to set up an appointment within the next few days or as soon as reasonably possible so that the symptoms can be re-evaluated for improvement or for any other questions. I also gave Minnie common sense return precautions and prompted patient to return to the emergency department within 24 - 48hrs if there are any new, worsening, or concerning symptoms. The patient verbalized understanding of the discharge instructions and agreed with them. Minnie was discharged in stable condition.     Dragon disclaimer:  Parts of this note may be an electronic transcription/translation of spoken language to printed text  using the Dragon dictation system.       Problems Addressed:  Inflamed sebaceous cyst: complicated acute illness or injury    Risk  Prescription drug management.        Final diagnoses:   Inflamed sebaceous cyst       ED Disposition  ED Disposition       ED Disposition   Discharge    Condition   Stable    Comment   --               Krishan Mariscal MD  [address] [facility] [id number] [phone]    Schedule an appointment as soon as possible for a visit       Salas Castro MD  [address] [facility] [address]  [place] [id number] [phone]          [facility] EMERGENCY DEPARTMENT  [address] [place] [id number] [phone]    If symptoms worsen         Medication List        New Prescriptions      cephalexin 500 MG capsule  Commonly known as: KEFLEX  Take 1 capsule by mouth 4 (Four) Times a Day for 7 days.     sulfamethoxazole-trimethoprim 800-160 MG per tablet  Commonly known as: BACTRIM DS,SEPTRA DS  Take 1 tablet by mouth 2 (Two) Times a Day for 7 days.               Where to Get Your Medications        These medications were sent to [id number] - [place], [place] - [address] [place] [address] AT [address] & [facility] [phone]  - [phone] FX  [address] [place] [id number]      Phone: [phone]   cephalexin 500 MG capsule  sulfamethoxazole-trimethoprim 800-160 MG per tablet            Cesar Haywood, APRN  12/01/24 9025